# Patient Record
Sex: MALE | Race: WHITE | NOT HISPANIC OR LATINO | Employment: OTHER | URBAN - METROPOLITAN AREA
[De-identification: names, ages, dates, MRNs, and addresses within clinical notes are randomized per-mention and may not be internally consistent; named-entity substitution may affect disease eponyms.]

---

## 2017-01-11 ENCOUNTER — OFFICE VISIT (OUTPATIENT)
Dept: SURGERY | Facility: CLINIC | Age: 52
End: 2017-01-11

## 2017-01-11 DIAGNOSIS — K21.9 GASTROESOPHAGEAL REFLUX DISEASE, ESOPHAGITIS PRESENCE NOT SPECIFIED: Primary | ICD-10-CM

## 2017-01-11 DIAGNOSIS — Z12.11 ENCOUNTER FOR SCREENING COLONOSCOPY: ICD-10-CM

## 2017-01-11 DIAGNOSIS — K61.0 PERIANAL ABSCESS: ICD-10-CM

## 2017-01-11 PROCEDURE — 99024 POSTOP FOLLOW-UP VISIT: CPT | Performed by: SURGERY

## 2017-01-11 RX ORDER — SODIUM CHLORIDE 0.9 % (FLUSH) 0.9 %
1-10 SYRINGE (ML) INJECTION AS NEEDED
Status: CANCELLED | OUTPATIENT
Start: 2017-01-11

## 2017-01-11 NOTE — MR AVS SNAPSHOT
Rashid Maldonado   1/11/2017 9:00 AM   Office Visit    Dept Phone:  [phone]   Encounter #:  [id number]    Provider:  Denton Matthews MD   Department:  [facility] GENERAL SURGERY                Your Full Care Plan              Today's Medication Changes          These changes are accurate as of: 1/11/17  9:24 AM.  If you have any questions, ask your nurse or doctor.               Stop taking medication(s)listed here:     saccharomyces boulardii 250 MG capsule   Commonly known as:  FLORASTOR   Stopped by:  Denton Matthews MD                      Your Updated Medication List          This list is accurate as of: 1/11/17  9:24 AM.  Always use your most recent med list.                ALPRAZolam 0.25 MG tablet   Commonly known as:  XANAX       apixaban 5 MG tablet tablet   Commonly known as:  ELIQUIS       aspirin 81 MG chewable tablet       cefdinir 300 MG capsule   Commonly known as:  OMNICEF   Take 1 po bid       Chlorcyclizine-Pseudoephed 25-60 MG tablet   Commonly known as:  STAHIST AD   Take 1 tablet po tid prn       FIBER PO       fluticasone 50 MCG/ACT nasal spray   Commonly known as:  FLONASE       gabapentin 600 MG tablet   Commonly known as:  NEURONTIN       MULTIVITAMIN PO       omeprazole-sodium bicarbonate  MG per capsule   Commonly known as:  ZEGERID       PROBIOTIC PO       PROSTATE HEALTH PO       THYROID PO               We Performed the Following     Case Request     Case Request       You Were Diagnosed With        Codes Comments    Gastroesophageal reflux disease, esophagitis presence not specified    -  Primary ICD-10-CM: K21.9  ICD-9-CM: 530.81     Encounter for screening colonoscopy     ICD-10-CM: Z12.11  ICD-9-CM: V76.51     Perianal abscess     ICD-10-CM: K61.0  ICD-9-CM: 566       Instructions     None    Patient Instructions History      Upcoming Appointments     Visit Type Date Time Department    FOLLOW UP 1/11/2017  9:00 AM [facility]  [facility]    NEW PATIENT LAB 2017  8:40 AM  [facility]    NEW PT - HEMATOLOGY 2017  9:40 AM [facility] Signup     [facility] Viewpost allows you to send messages to your doctor, view your test results, renew your prescriptions, schedule appointments, and more. To sign up, go to [employer] and click on the Sign Up Now link in the New User? box. Enter your Viewpost Activation Code exactly as it appears below along with the last four digits of your Social Security Number and your Date of Birth () to complete the sign-up process. If you do not sign up before the expiration date, you must request a new code.    Viewpost Activation Code: [email or web address]  Expires: 2017  9:24 AM    If you have questions, you can email [email or web address] or call [phone] to talk to our Viewpost staff. Remember, Viewpost is NOT to be used for urgent needs. For medical emergencies, dial 911.               Other Info from Your Visit           Your Appointments     2017  8:40 AM EST   New Patient Lab with LAB CHAIR 1 [facility] ONC LAB (--)    [address] [id number] [phone]            2017  9:40 AM EST   NEW HEMATOLOGY with Henrry Gibbons MD   [facility] ([facility] [place])    [address] [address] [phone]              Allergies     No Known Allergies      Reason for Visit     Post-op PO incision and drainage of perianal abscess 16      Vital Signs     Smoking Status                   Former Smoker           Problems and Diagnoses Noted     Acid reflux disease    -  Primary    Screen for colon cancer        Perianal abscess

## 2017-01-11 NOTE — PROGRESS NOTES
Chief Complaint   Patient presents with   • Post-op     PO incision and drainage of perianal abscess 12/28/16       Rashid Maldonado is a 51 y.o. male  follows up today after recent drainage of a perianal abscess.  He says he's feeling much better and hasn't had any significant discomfort.  He still had small amounts of discharge.  In addition he has never had a colonoscopy and had an episode of what sounds like colitis earlier this year.  A CT scan was done at an outside hospital and demonstrated some thickening of the sigmoid colon.  Colonoscopy was not performed at that time as the patient is also been dealing with issues regards to his history of DVT and is on anticoagulants for this.  He is going to see hematologist next week I believe for evaluation of this and hopefully he can be taken off of his anticoagulants for a few days to undergo colonoscopy.    He also complains of some reflux symptoms which seem to be worse over the last couple of months.  Certain foods seem to worsen the symptoms.  Symptoms are worse when he lies down at night.    He does say that since the episode of colitis his bowels don't seem to be functioning quite as well and he is having more episodes of diarrhea.  He's not seeing any blood in his stools for quite some time.      Past Medical History   Diagnosis Date   • Anxiety and depression    • DVT (deep venous thrombosis)    • DVT (deep venous thrombosis)    • Pulmonary embolism    • Shortness of breath        Past Surgical History   Procedure Laterality Date   • Back surgery N/A 2015     done at [facility] in [place], [place]   • Knee surgery Left 1985     Dr. Gloria         Current Outpatient Prescriptions:   •  ALPRAZolam (XANAX) 0.25 MG tablet, Take 0.25 mg by mouth 2 (Two) Times a Day As Needed for anxiety., Disp: , Rfl:   •  apixaban (ELIQUIS) 5 MG tablet tablet, Take 5 mg by mouth 2 (Two) Times a Day., Disp: , Rfl:   •  aspirin 81 MG chewable tablet, Chew 81 mg Daily., Disp: ,  Rfl:   •  cefdinir (OMNICEF) 300 MG capsule, Take 1 po bid (Patient taking differently: Take 300 mg by mouth 2 (Two) Times a Day.), Disp: 20 capsule, Rfl: 0  •  Chlorcyclizine-Pseudoephed (STAHIST AD) 25-60 MG tablet, Take 1 tablet po tid prn (Patient taking differently: 1 tablet 3 (Three) Times a Day.), Disp: 30 tablet, Rfl: 0  •  FIBER PO, Take 1 capsule by mouth Daily., Disp: , Rfl:   •  fluticasone (FLONASE) 50 MCG/ACT nasal spray, 1 spray into each nostril As Needed., Disp: , Rfl: 2  •  gabapentin (NEURONTIN) 600 MG tablet, Take 300 mg by mouth 2 (Two) Times a Day. Take 1/2 of a 600 mg tablet in am and 1/2 of a 600 mg tablet in pm, Disp: , Rfl:   •  Misc Natural Products (PROSTATE HEALTH PO), Take 1 tablet by mouth Daily., Disp: , Rfl:   •  Multiple Vitamins-Minerals (MULTIVITAMIN PO), Take 1 tablet by mouth Daily. Resetigen-D, Disp: , Rfl:   •  omeprazole-sodium bicarbonate (ZEGERID)  MG per capsule, Take 1 capsule by mouth As Needed., Disp: , Rfl: 0  •  Probiotic Product (PROBIOTIC PO), Take 1 capsule by mouth Daily., Disp: , Rfl:   •  THYROID PO, Take 1 tablet by mouth Daily. Thyroid Support Nature Bound, Disp: , Rfl:     No Known Allergies    Family History   Problem Relation Age of Onset   • Hypertension Mother    • Lung cancer Mother        Social History     Social History   • Marital status:      Spouse name: N/A   • Number of children: N/A   • Years of education: N/A     Occupational History   • Not on file.     Social History Main Topics   • Smoking status: Former Smoker     Years: 17.00     Types: Cigarettes     Quit date: 2000   • Smokeless tobacco: Not on file   • Alcohol use Yes      Comment: occassionally    • Drug use: Yes     Special: Marijuana   • Sexual activity: Not on file     Other Topics Concern   • Not on file     Social History Narrative       ROS: 14 systems were reviewed and negative other than presenting complaints.          There were no vitals filed for this  visit.    PHYSICAL EXAM:  - Constitutional: Well-developed well-nourished, no acute distress  - Eyes: Conjunctiva normal, sclera nonicteric  - ENMT: Hearing grossly normal, oral mucosa moist  - Respiratory: Clear to auscultation, normal inspiratory effort  - Cardiovascular: Regular rate, no peripheral edema, no jugular venous distention  - Gastrointestinal: Soft, nontender, no palpable mass, no hepatosplenomegaly, negative for hernia, bowel sounds normal  - Skin: Warm, dry  - Musculoskeletal: Symmetric strength, normal gait  - Psychiatric: Alert and oriented ×3, normal affect   - Rectal :Incision of previous abscess is healing and looks quite good.  No cellulitis noted.  No significant hemorrhoidal disease.           Assessment/Plan     This is a nice gentleman who follows up after recent incision and drainage of an abscess.  This is healing well and I recommended just continued sitz baths for him in this regard.  He is due for a colonoscopy and is never had this done and so I think it would be appropriate to arrange this for him.  We will set it up after he sees his hematologist in case they felt it was not safe for him to come off of his anticoagulants.  In addition with regard to his reflux symptoms is probably reasonable to perform an upper endoscopy at the same time to ensure there is no hiatus hernia or other source for these symptoms.    The risks and benefits of colonoscopy and upper endoscopy were discussed with the patient and he agreed to proceed.       Denton Matthews MD  General and Endoscopic Surgery  [facility]    [address], [address]  [place], [place], [address]  P: [phone]  F: [phone]

## 2017-01-16 DIAGNOSIS — I82.409 DEEP VEIN THROMBOSIS (DVT) OF LOWER EXTREMITY, UNSPECIFIED CHRONICITY, UNSPECIFIED LATERALITY, UNSPECIFIED VEIN (HCC): Primary | ICD-10-CM

## 2017-01-17 ENCOUNTER — LAB (OUTPATIENT)
Dept: ONCOLOGY | Facility: HOSPITAL | Age: 52
End: 2017-01-17
Attending: SURGERY

## 2017-01-17 ENCOUNTER — CONSULT (OUTPATIENT)
Dept: ONCOLOGY | Facility: CLINIC | Age: 52
End: 2017-01-17

## 2017-01-17 VITALS
WEIGHT: 293 LBS | HEART RATE: 55 BPM | SYSTOLIC BLOOD PRESSURE: 149 MMHG | HEIGHT: 70 IN | DIASTOLIC BLOOD PRESSURE: 81 MMHG | OXYGEN SATURATION: 96 % | TEMPERATURE: 98 F | BODY MASS INDEX: 41.95 KG/M2 | RESPIRATION RATE: 16 BRPM

## 2017-01-17 DIAGNOSIS — I26.99 OTHER ACUTE PULMONARY EMBOLISM WITHOUT ACUTE COR PULMONALE (HCC): Primary | ICD-10-CM

## 2017-01-17 DIAGNOSIS — I82.409 DEEP VEIN THROMBOSIS (DVT) OF LOWER EXTREMITY, UNSPECIFIED CHRONICITY, UNSPECIFIED LATERALITY, UNSPECIFIED VEIN (HCC): ICD-10-CM

## 2017-01-17 LAB
BASOPHILS # BLD AUTO: 0.04 10*3/MM3 (ref 0–0.2)
BASOPHILS NFR BLD AUTO: 0.7 % (ref 0–1.5)
DEPRECATED RDW RBC AUTO: 41.7 FL (ref 37–54)
EOSINOPHIL # BLD AUTO: 0.26 10*3/MM3 (ref 0–0.7)
EOSINOPHIL NFR BLD AUTO: 4.5 % (ref 0.3–6.2)
ERYTHROCYTE [DISTWIDTH] IN BLOOD BY AUTOMATED COUNT: 13.1 % (ref 11.5–14.5)
HCT VFR BLD AUTO: 42.5 % (ref 40.4–52.2)
HGB BLD-MCNC: 14.6 G/DL (ref 13.7–17.6)
IMM GRANULOCYTES # BLD: 0.05 10*3/MM3 (ref 0–0.03)
IMM GRANULOCYTES NFR BLD: 0.9 % (ref 0–0.5)
LYMPHOCYTES # BLD AUTO: 2.12 10*3/MM3 (ref 0.9–4.8)
LYMPHOCYTES NFR BLD AUTO: 36.7 % (ref 19.6–45.3)
MCH RBC QN AUTO: 30.4 PG (ref 27–32.7)
MCHC RBC AUTO-ENTMCNC: 34.4 G/DL (ref 32.6–36.4)
MCV RBC AUTO: 88.5 FL (ref 79.8–96.2)
MONOCYTES # BLD AUTO: 0.58 10*3/MM3 (ref 0.2–1.2)
MONOCYTES NFR BLD AUTO: 10.1 % (ref 5–12)
NEUTROPHILS # BLD AUTO: 2.72 10*3/MM3 (ref 1.9–8.1)
NEUTROPHILS NFR BLD AUTO: 47.1 % (ref 42.7–76)
NRBC BLD MANUAL-RTO: 0 /100 WBC (ref 0–0)
PLATELET # BLD AUTO: 253 10*3/MM3 (ref 140–500)
PMV BLD AUTO: 9 FL (ref 6–12)
RBC # BLD AUTO: 4.8 10*6/MM3 (ref 4.6–6)
WBC NRBC COR # BLD: 5.77 10*3/MM3 (ref 4.5–10.7)

## 2017-01-17 PROCEDURE — 36415 COLL VENOUS BLD VENIPUNCTURE: CPT

## 2017-01-17 PROCEDURE — 99243 OFF/OP CNSLTJ NEW/EST LOW 30: CPT | Performed by: INTERNAL MEDICINE

## 2017-01-17 PROCEDURE — 85025 COMPLETE CBC W/AUTO DIFF WBC: CPT | Performed by: INTERNAL MEDICINE

## 2017-01-17 NOTE — LETTER
January 17, 2017     Denton Matthews MD  [address] [facility] [address]  [place] [address]    Patient: Rashid Maldonado   YOB: 1965   Date of Visit: 1/17/2017       Dear Dr. Byron MD:    Thank you for referring Rashid Maldonado to me for evaluation. Below are the relevant portions of my assessment and plan of care.    If you have questions, please do not hesitate to call me. I look forward to following Rashid along with you.         Sincerely,        Henrry Gibbons MD        CC: Meredith Lea Kehrer, MD Andrew Hart, MD  1/17/2017 10:46 AM  Signed  REFERRING PROVIDER:  Denton Matthews MD  [address] [facility] [facility] [place] [address]    REASON FOR CONSULTATION:    1.  Recurrent venous thromboembolism    HISTORY OF PRESENT ILLNESS:  Rashid Maldonado is a 51 y.o. male who is referred today recurrent venous thromboembolism.  He had a left lower extremity deep vein thrombosis diagnosed by duplex on 4/25/2012 in the left popliteal and peroneal veins.  By July 2012 the thrombosis had resolved.  The patient reports that at the time of his clots he had an upper respiratory infection and was laying around more but denies any other provoking factors at that time.  He apparently was anticoagulated for 3 months and then with the negative ultrasound in July this was discontinued.  The patient is a little bit unclear about the details.    He developed some dyspnea on exertion for a few weeks and then developed left lower extremity pain and swelling.  He presented to [facility] in [place] on 8/18/2016 with shortness of breath.  He also had a gastrointestinal illness at that time consistent with enteritis.  He was diagnosed with bilateral pulmonary emboli left greater than right.  A left lower extremity duplex showed a deep vein thrombosis and left femoral and popliteal veins.  He was started on Eliquis and remains anticoagulated.    He had a laboratory thrombophilia evaluation.  Testing for a lupus anticoagulant  was negative.  Anticardiolipin antibodies were normal.  Anti-thrombin levels were normal.  Protein C and S antigen and activity levels were normal.  Testing was negative for a factor V Leiden mutation.  I do not see that a prothrombin gene mutation analysis was done.    He subsequently developed a perianal abscess which required incision and drainage by Dr. Matthews on 12/28/2016.  He would like to perform upper and lower endoscopy.  He had CT imaging on 8/11/2016 at [facility] in [place] showing evidence of transverse and descending colitis.       He is doing well on the anticoagulation.  He denies significant bleeding.  He has chronic back pain which is better after laser surgery.  He has chronic hearing loss.  He continues to have some very mild dyspnea on exertion.  Left lower extremity pain and swelling has resolved.  He remains quite active.    Past Medical History   Diagnosis Date   • Anxiety and depression    • DVT (deep venous thrombosis)    • Pulmonary embolism    • Shortness of breath        Past Surgical History   Procedure Laterality Date   • Back surgery N/A 2015     done at [facility] in [place], [place]   • Knee surgery Left 1985     Dr. Gloria       SOCIAL HISTORY:   reports that he quit smoking about 17 years ago. His smoking use included Cigarettes. He quit after 17.00 years of use. He does not have any smokeless tobacco history on file. He reports that he drinks alcohol. He reports that he uses illicit drugs, including Marijuana.    FAMILY HISTORY:  family history includes Hypertension in his mother; Lung cancer in his mother.  Uncle with leukemia.  Grandfather with cancer.    ALLERGIES:  No Known Allergies    MEDICATIONS:  The medication list has been reviewed with the patient by the medical assistant, and the list has been updated in the electronic medical record, which I reviewed.  Medication dosages and frequencies were confirmed to be accurate.    Review of Systems   Constitutional:  "Negative for appetite change, chills, fatigue, fever and unexpected weight change.   HENT: Positive for hearing loss. Negative for congestion, dental problem, ear pain, mouth sores, nosebleeds, postnasal drip, rhinorrhea, tinnitus and trouble swallowing.    Eyes: Negative.    Respiratory: Positive for shortness of breath. Negative for cough, chest tightness, wheezing and stridor.    Cardiovascular: Negative for chest pain, palpitations and leg swelling.   Gastrointestinal: Negative for abdominal distention, abdominal pain, blood in stool, constipation, diarrhea, nausea and vomiting.   Endocrine: Negative.    Genitourinary: Negative for decreased urine volume, difficulty urinating, dysuria, flank pain, frequency, hematuria, scrotal swelling, testicular pain and urgency.   Musculoskeletal: Positive for arthralgias and back pain. Negative for joint swelling.   Allergic/Immunologic: Negative.    Neurological: Negative for dizziness, seizures, syncope, weakness, light-headedness, numbness and headaches.   Hematological: Negative for adenopathy. Does not bruise/bleed easily.   Psychiatric/Behavioral: Negative for confusion and sleep disturbance. The patient is not nervous/anxious.    All other systems reviewed and are negative.      Vitals:    01/17/17 0941   BP: 149/81   Pulse: 55   Resp: 16   Temp: 98 °F (36.7 °C)   SpO2: 96%   Weight: 293 lb (133 kg)   Height: 70.08\" (178 cm)  Comment: new ht   PainSc: 0-No pain       Physical Exam   Constitutional: He is oriented to person, place, and time. He appears well-developed and well-nourished. No distress.   overweight   HENT:   Head: Normocephalic and atraumatic.   Mouth/Throat: Oropharynx is clear and moist.   Eyes: Conjunctivae and EOM are normal. Pupils are equal, round, and reactive to light.   Neck: Normal range of motion. Neck supple. No thyromegaly present.   Cardiovascular: Normal rate, regular rhythm and normal heart sounds.  Exam reveals no gallop and no friction " rub.    No murmur heard.  Pulmonary/Chest: Effort normal and breath sounds normal. No respiratory distress. He has no wheezes. He has no rales.   Abdominal: Soft. Bowel sounds are normal. He exhibits no distension and no mass. There is no tenderness. There is no rebound and no guarding.   Musculoskeletal: Normal range of motion. He exhibits no edema or tenderness.   Trace left leg edema below the knee   Lymphadenopathy:     He has no cervical adenopathy.   Neurological: He is alert and oriented to person, place, and time. He has normal reflexes.   Skin: Skin is warm and dry. No rash noted. He is not diaphoretic.   Psychiatric: He has a normal mood and affect. His behavior is normal.   Nursing note and vitals reviewed.      DIAGNOSTIC DATA:  Lab Results   Component Value Date    WBC 5.77 01/17/2017    HGB 14.6 01/17/2017    HCT 42.5 01/17/2017    MCV 88.5 01/17/2017     01/17/2017       IMAGING:  None for review     ASSESSMENT:  This is a 51 y.o. male with recurrent left lower extremity deep vein thrombosis as well as bilateral pulmonary emboli.  He is currently on Eliquis which is certainly appropriate.  A laboratory thrombophilia evaluation was unremarkable.  I do not see that the prothrombin gene mutation was tested and so this could be done in the future if desired.  However, I do not believe that the results of this test will influence my recommendation, which would be for indefinite anticoagulation considering the fact that he has had recurrent venous thromboembolism that was relatively unprovoked aside from a short period of relative immobilization due to acute illness each time.    With respect to the issue of endoscopy, he would need to temporarily discontinue the Eliquis prior to the procedure.  Eliquis, given its short half life of about 12 hours, is usually held about 24-48 hours prior to procedures, and depending on the risk of bleeding after the procedure it is usually resumed the following day.   I would recommend that he resume Eliquis after the procedure and, again, stay on the medication indefinitely.     PLAN:   1.  He did not want to have further phlebotomy today for the prothrombin gene mutation testing, but this could certainly be done in the future if desired.  2.  I recommend he continue anticoagulation with Eliquis indefinitely.  3.  With respect to the endoscopy, I think it is okay for him to temporarily discontinue anticoagulation with Eliquis around the  Time of the procedure as discussed above.

## 2017-01-17 NOTE — PROGRESS NOTES
REFERRING PROVIDER:  Denton Matthews MD  [id number]  [address] [address]    REASON FOR CONSULTATION:    1.  Recurrent venous thromboembolism    HISTORY OF PRESENT ILLNESS:  Rashid Maldonado is a 51 y.o. male who is referred today recurrent venous thromboembolism.  He had a left lower extremity deep vein thrombosis diagnosed by duplex on 4/25/2012 in the left popliteal and peroneal veins.  By July 2012 the thrombosis had resolved.  The patient reports that at the time of his clots he had an upper respiratory infection and was laying around more but denies any other provoking factors at that time.  He apparently was anticoagulated for 3 months and then with the negative ultrasound in July this was discontinued.  The patient is a little bit unclear about the details.    He developed some dyspnea on exertion for a few weeks and then developed left lower extremity pain and swelling.  He presented to [facility] in [place] on 8/18/2016 with shortness of breath.  He also had a gastrointestinal illness at that time consistent with enteritis.  He was diagnosed with bilateral pulmonary emboli left greater than right.  A left lower extremity duplex showed a deep vein thrombosis and left femoral and popliteal veins.  He was started on Eliquis and remains anticoagulated.    He had a laboratory thrombophilia evaluation.  Testing for a lupus anticoagulant was negative.  Anticardiolipin antibodies were normal.  Anti-thrombin levels were normal.  Protein C and S antigen and activity levels were normal.  Testing was negative for a factor V Leiden mutation.  I do not see that a prothrombin gene mutation analysis was done.    He subsequently developed a perianal abscess which required incision and drainage by Dr. Matthews on 12/28/2016.  He would like to perform upper and lower endoscopy.  He had CT imaging on 8/11/2016 at [facility] in [place] showing evidence of transverse and descending colitis.        He is doing well on the anticoagulation.  He denies significant bleeding.  He has chronic back pain which is better after laser surgery.  He has chronic hearing loss.  He continues to have some very mild dyspnea on exertion.  Left lower extremity pain and swelling has resolved.  He remains quite active.    Past Medical History   Diagnosis Date   • Anxiety and depression    • DVT (deep venous thrombosis)    • Pulmonary embolism    • Shortness of breath        Past Surgical History   Procedure Laterality Date   • Back surgery N/A 2015     done at [facility] in [place], [place]   • Knee surgery Left 1985     Dr. Gloria       SOCIAL HISTORY:   reports that he quit smoking about 17 years ago. His smoking use included Cigarettes. He quit after 17.00 years of use. He does not have any smokeless tobacco history on file. He reports that he drinks alcohol. He reports that he uses illicit drugs, including Marijuana.    FAMILY HISTORY:  family history includes Hypertension in his mother; Lung cancer in his mother.  Uncle with leukemia.  Grandfather with cancer.    ALLERGIES:  No Known Allergies    MEDICATIONS:  The medication list has been reviewed with the patient by the medical assistant, and the list has been updated in the electronic medical record, which I reviewed.  Medication dosages and frequencies were confirmed to be accurate.    Review of Systems   Constitutional: Negative for appetite change, chills, fatigue, fever and unexpected weight change.   HENT: Positive for hearing loss. Negative for congestion, dental problem, ear pain, mouth sores, nosebleeds, postnasal drip, rhinorrhea, tinnitus and trouble swallowing.    Eyes: Negative.    Respiratory: Positive for shortness of breath. Negative for cough, chest tightness, wheezing and stridor.    Cardiovascular: Negative for chest pain, palpitations and leg swelling.   Gastrointestinal: Negative for abdominal distention, abdominal pain, blood in stool, constipation,  "diarrhea, nausea and vomiting.   Endocrine: Negative.    Genitourinary: Negative for decreased urine volume, difficulty urinating, dysuria, flank pain, frequency, hematuria, scrotal swelling, testicular pain and urgency.   Musculoskeletal: Positive for arthralgias and back pain. Negative for joint swelling.   Allergic/Immunologic: Negative.    Neurological: Negative for dizziness, seizures, syncope, weakness, light-headedness, numbness and headaches.   Hematological: Negative for adenopathy. Does not bruise/bleed easily.   Psychiatric/Behavioral: Negative for confusion and sleep disturbance. The patient is not nervous/anxious.    All other systems reviewed and are negative.      Vitals:    01/17/17 0941   BP: 149/81   Pulse: 55   Resp: 16   Temp: 98 °F (36.7 °C)   SpO2: 96%   Weight: 293 lb (133 kg)   Height: 70.08\" (178 cm)  Comment: new    PainSc: 0-No pain       Physical Exam   Constitutional: He is oriented to person, place, and time. He appears well-developed and well-nourished. No distress.   overweight   HENT:   Head: Normocephalic and atraumatic.   Mouth/Throat: Oropharynx is clear and moist.   Eyes: Conjunctivae and EOM are normal. Pupils are equal, round, and reactive to light.   Neck: Normal range of motion. Neck supple. No thyromegaly present.   Cardiovascular: Normal rate, regular rhythm and normal heart sounds.  Exam reveals no gallop and no friction rub.    No murmur heard.  Pulmonary/Chest: Effort normal and breath sounds normal. No respiratory distress. He has no wheezes. He has no rales.   Abdominal: Soft. Bowel sounds are normal. He exhibits no distension and no mass. There is no tenderness. There is no rebound and no guarding.   Musculoskeletal: Normal range of motion. He exhibits no edema or tenderness.   Trace left leg edema below the knee   Lymphadenopathy:     He has no cervical adenopathy.   Neurological: He is alert and oriented to person, place, and time. He has normal reflexes.   Skin: " Skin is warm and dry. No rash noted. He is not diaphoretic.   Psychiatric: He has a normal mood and affect. His behavior is normal.   Nursing note and vitals reviewed.      DIAGNOSTIC DATA:  Lab Results   Component Value Date    WBC 5.77 01/17/2017    HGB 14.6 01/17/2017    HCT 42.5 01/17/2017    MCV 88.5 01/17/2017     01/17/2017       IMAGING:  None for review     ASSESSMENT:  This is a 51 y.o. male with recurrent left lower extremity deep vein thrombosis as well as bilateral pulmonary emboli.  He is currently on Eliquis which is certainly appropriate.  A laboratory thrombophilia evaluation was unremarkable.  I do not see that the prothrombin gene mutation was tested and so this could be done in the future if desired.  However, I do not believe that the results of this test will influence my recommendation, which would be for indefinite anticoagulation considering the fact that he has had recurrent venous thromboembolism that was relatively unprovoked aside from a short period of relative immobilization due to acute illness each time.    With respect to the issue of endoscopy, he would need to temporarily discontinue the Eliquis prior to the procedure.  Eliquis, given its short half life of about 12 hours, is usually held about 24-48 hours prior to procedures, and depending on the risk of bleeding after the procedure it is usually resumed the following day.  I would recommend that he resume Eliquis after the procedure and, again, stay on the medication indefinitely.     PLAN:   1.  He did not want to have further phlebotomy today for the prothrombin gene mutation testing, but this could certainly be done in the future if desired.  2.  I recommend he continue anticoagulation with Eliquis indefinitely.  3.  With respect to the endoscopy, I think it is okay for him to temporarily discontinue anticoagulation with Eliquis around the  Time of the procedure as discussed above.

## 2017-01-19 ENCOUNTER — TELEPHONE (OUTPATIENT)
Dept: SURGERY | Facility: CLINIC | Age: 52
End: 2017-01-19

## 2017-01-20 ENCOUNTER — TELEPHONE (OUTPATIENT)
Dept: SURGERY | Facility: CLINIC | Age: 52
End: 2017-01-20

## 2017-01-20 NOTE — TELEPHONE ENCOUNTER
Pt has colonoscopy scheduled on 1/24 but still has open wound from drainage of perirectal abscess on 12/28/16, is it okay for him to go forward w/ the colonoscopy even though the wound is still open? Please advise.

## 2017-01-20 NOTE — TELEPHONE ENCOUNTER
That should be fine, just reminded the patient to take some sitz baths during the course of his bowel prep.

## 2017-01-24 ENCOUNTER — HOSPITAL ENCOUNTER (OUTPATIENT)
Facility: HOSPITAL | Age: 52
Setting detail: HOSPITAL OUTPATIENT SURGERY
Discharge: HOME | End: 2017-01-24
Attending: SURGERY | Admitting: SURGERY

## 2017-01-24 ENCOUNTER — ANESTHESIA EVENT (OUTPATIENT)
Dept: GASTROENTEROLOGY | Facility: HOSPITAL | Age: 52
End: 2017-01-24

## 2017-01-24 ENCOUNTER — ANESTHESIA (OUTPATIENT)
Dept: GASTROENTEROLOGY | Facility: HOSPITAL | Age: 52
End: 2017-01-24

## 2017-01-24 VITALS
TEMPERATURE: 98 F | RESPIRATION RATE: 16 BRPM | BODY MASS INDEX: 40.66 KG/M2 | SYSTOLIC BLOOD PRESSURE: 139 MMHG | DIASTOLIC BLOOD PRESSURE: 73 MMHG | WEIGHT: 284 LBS | OXYGEN SATURATION: 99 % | HEART RATE: 63 BPM | HEIGHT: 70 IN

## 2017-01-24 DIAGNOSIS — Z12.11 ENCOUNTER FOR SCREENING COLONOSCOPY: ICD-10-CM

## 2017-01-24 DIAGNOSIS — K21.9 GASTROESOPHAGEAL REFLUX DISEASE, ESOPHAGITIS PRESENCE NOT SPECIFIED: ICD-10-CM

## 2017-01-24 PROCEDURE — 88312 SPECIAL STAINS GROUP 1: CPT | Performed by: SURGERY

## 2017-01-24 PROCEDURE — 45378 DIAGNOSTIC COLONOSCOPY: CPT | Performed by: SURGERY

## 2017-01-24 PROCEDURE — 43239 EGD BIOPSY SINGLE/MULTIPLE: CPT | Performed by: SURGERY

## 2017-01-24 PROCEDURE — 25010000002 PROPOFOL 10 MG/ML EMULSION: Performed by: ANESTHESIOLOGY

## 2017-01-24 PROCEDURE — 25010000002 FENTANYL CITRATE (PF) 100 MCG/2ML SOLUTION: Performed by: ANESTHESIOLOGY

## 2017-01-24 PROCEDURE — 88305 TISSUE EXAM BY PATHOLOGIST: CPT | Performed by: SURGERY

## 2017-01-24 RX ORDER — PROPOFOL 10 MG/ML
VIAL (ML) INTRAVENOUS CONTINUOUS PRN
Status: DISCONTINUED | OUTPATIENT
Start: 2017-01-24 | End: 2017-01-24 | Stop reason: SURG

## 2017-01-24 RX ORDER — SODIUM CHLORIDE, SODIUM LACTATE, POTASSIUM CHLORIDE, CALCIUM CHLORIDE 600; 310; 30; 20 MG/100ML; MG/100ML; MG/100ML; MG/100ML
INJECTION, SOLUTION INTRAVENOUS CONTINUOUS PRN
Status: DISCONTINUED | OUTPATIENT
Start: 2017-01-24 | End: 2017-01-24 | Stop reason: SURG

## 2017-01-24 RX ORDER — FENTANYL CITRATE 50 UG/ML
INJECTION, SOLUTION INTRAMUSCULAR; INTRAVENOUS AS NEEDED
Status: DISCONTINUED | OUTPATIENT
Start: 2017-01-24 | End: 2017-01-24 | Stop reason: SURG

## 2017-01-24 RX ORDER — LIDOCAINE HYDROCHLORIDE 20 MG/ML
INJECTION, SOLUTION INFILTRATION; PERINEURAL AS NEEDED
Status: DISCONTINUED | OUTPATIENT
Start: 2017-01-24 | End: 2017-01-24 | Stop reason: SURG

## 2017-01-24 RX ORDER — SODIUM CHLORIDE, SODIUM LACTATE, POTASSIUM CHLORIDE, CALCIUM CHLORIDE 600; 310; 30; 20 MG/100ML; MG/100ML; MG/100ML; MG/100ML
9 INJECTION, SOLUTION INTRAVENOUS CONTINUOUS
Status: DISCONTINUED | OUTPATIENT
Start: 2017-01-24 | End: 2017-01-24 | Stop reason: HOSPADM

## 2017-01-24 RX ORDER — SODIUM CHLORIDE 0.9 % (FLUSH) 0.9 %
1-10 SYRINGE (ML) INJECTION AS NEEDED
Status: DISCONTINUED | OUTPATIENT
Start: 2017-01-24 | End: 2017-01-24 | Stop reason: HOSPADM

## 2017-01-24 RX ADMIN — FENTANYL CITRATE 50 MCG: 50 INJECTION INTRAMUSCULAR; INTRAVENOUS at 12:50

## 2017-01-24 RX ADMIN — SODIUM CHLORIDE, POTASSIUM CHLORIDE, SODIUM LACTATE AND CALCIUM CHLORIDE: 600; 310; 30; 20 INJECTION, SOLUTION INTRAVENOUS at 12:47

## 2017-01-24 RX ADMIN — PROPOFOL 160 MCG/KG/MIN: 10 INJECTION, EMULSION INTRAVENOUS at 12:50

## 2017-01-24 RX ADMIN — LIDOCAINE HYDROCHLORIDE 50 MG: 20 INJECTION, SOLUTION INFILTRATION; PERINEURAL at 12:50

## 2017-01-24 RX ADMIN — SODIUM CHLORIDE, POTASSIUM CHLORIDE, SODIUM LACTATE AND CALCIUM CHLORIDE 9 ML/HR: 600; 310; 30; 20 INJECTION, SOLUTION INTRAVENOUS at 11:31

## 2017-01-24 NOTE — IP AVS SNAPSHOT
AFTER VISIT SUMMARY             Rashid Maldonado           About your hospitalization     You were admitted on:  January 24, 2017 You last received care in the:  [facility] ENDO SUITES       Procedures & Surgeries      Procedure(s) (LRB):  COLONOSCOPY to Cecum (N/A)  ESOPHAGOGASTRODUODENOSCOPY with Bx's (N/A)     1/24/2017     Surgeon(s):  Denton Matthews MD  -------------------      Medications    If you or your caregiver advised us that you are currently taking a medication and that medication is marked below as “Resume”, this simply indicates that we have reviewed those medications to make sure our new therapy recommendations do not interfere.  If you have concerns about medications other than those new ones which we are prescribing today, please consult the physician who prescribed them (or your primary physician).  Our review of your home medications is not meant to indicate that we are directing their use.             Your Medications      CHANGE how you take these medications     Chlorcyclizine-Pseudoephed 25-60 MG tablet   Take 1 tablet po tid prn   According to our records, you may have been taking this medication differently.   Commonly known as:  STAHIST AD   What changed:    - how much to take  - when to take this  - additional instructions             CONTINUE taking these medications     ALPRAZolam 0.25 MG tablet   Take 0.25 mg by mouth 2 (Two) Times a Day As Needed for anxiety.   Commonly known as:  XANAX           apixaban 5 MG tablet tablet   Take 5 mg by mouth 2 (Two) Times a Day.   Commonly known as:  ELIQUIS           aspirin 81 MG chewable tablet   Chew 81 mg Daily.           FIBER PO   Take 1 capsule by mouth Daily.           fluticasone 50 MCG/ACT nasal spray   1 spray into each nostril As Needed.   Commonly known as:  FLONASE           gabapentin 600 MG tablet   Take 300 mg by mouth 2 (Two) Times a Day. Take 1/2 of a 600 mg tablet in am and 1/2 of a 600 mg tablet in pm      Commonly known as:  NEURONTIN           PROBIOTIC PO   Take 1 capsule by mouth Daily.           Unable to find   1 each 1 (One) Time. Resetigen-D                      Your Medications      Your Medication List           Morning Noon Evening Bedtime As Needed    ALPRAZolam 0.25 MG tablet   Take 0.25 mg by mouth 2 (Two) Times a Day As Needed for anxiety.   Commonly known as:  XANAX                                apixaban 5 MG tablet tablet   Take 5 mg by mouth 2 (Two) Times a Day.   Commonly known as:  ELIQUIS                                aspirin 81 MG chewable tablet   Chew 81 mg Daily.                                Chlorcyclizine-Pseudoephed 25-60 MG tablet   Take 1 tablet po tid prn   Commonly known as:  STAHIST AD                                FIBER PO   Take 1 capsule by mouth Daily.                                fluticasone 50 MCG/ACT nasal spray   1 spray into each nostril As Needed.   Commonly known as:  FLONASE                                gabapentin 600 MG tablet   Take 300 mg by mouth 2 (Two) Times a Day. Take 1/2 of a 600 mg tablet in am and 1/2 of a 600 mg tablet in pm   Commonly known as:  NEURONTIN                                PROBIOTIC PO   Take 1 capsule by mouth Daily.                                Unable to find   1 each 1 (One) Time. Resetigen-D                                         Instructions for After Discharge          Discharge Instructions       Call Dr. Matthews at [phone] if you have any questions or concerns    Discharge References/Attachments     COLONOSCOPY, CARE AFTER, EASY-TO-READ (ENGLISH)    DIVERTICULOSIS (ENGLISH)    ESOPHAGOGASTRODUODENOSCOPY, CARE AFTER (ENGLISH)    DUODENITIS (ENGLISH)       Follow-ups for After Discharge        [facility] Signup     [facility] [employer] allows you to send messages to your doctor, view your test results, renew your prescriptions, schedule appointments, and more. To sign up, go to [employer] and click on the Sign Up  Now link in the New User? box. Enter your [employer] Activation Code exactly as it appears below along with the last four digits of your Social Security Number and your Date of Birth () to complete the sign-up process. If you do not sign up before the expiration date, you must request a new code.    [employer] Activation Code: [email or web address]  Expires: 2017  9:24 AM    If you have questions, you can email [email or web address] or call [phone] to talk to our [employer] staff. Remember, [employer] is NOT to be used for urgent needs. For medical emergencies, dial 911.           Summary of Your Hospitalization        Reason for Hospitalization     Your primary diagnosis was:  Not on File      Care Providers     Provider Service Role Specialty    Denton Matthews MD Surgery Attending Provider General Surgery    Denton Matthews MD Surgery Surgeon  General Surgery      Your Allergies  Date Reviewed: 2017    No active allergies      Pending Labs     Order Current Status    Tissue Exam Collected (17 1141)      Patient Belongings Returned     Document Return of Belongings Flowsheet     Were the patient bedside belongings sent home?   --   Belongings Retrieved from Security & Sent Home   --    Belongings Sent to Safe   --   Medications Retrieved from Pharmacy & Sent Home   --              More Information      Colonoscopy, Care After  These instructions give you information on caring for yourself after your procedure. Your doctor may also give you more specific instructions. Call your doctor if you have any problems or questions after your procedure.  HOME CARE  · Do not drive for 24 hours.  · Do not sign important papers or use machinery for 24 hours.  · You may shower.  · You may go back to your usual activities, but go slower for the first 24 hours.  · Take rest breaks often during the first 24 hours.  · Walk around or use warm packs on your belly (abdomen) if you have belly cramping or gas.  · Drink  enough fluids to keep your pee (urine) clear or pale yellow.  · Resume your normal diet. Avoid heavy or fried foods.  · Avoid drinking alcohol for 24 hours or as told by your doctor.  · Only take medicines as told by your doctor.  If a tissue sample (biopsy) was taken during the procedure:   · Do not take aspirin or blood thinners for 7 days, or as told by your doctor.  · Do not drink alcohol for 7 days, or as told by your doctor.  · Eat soft foods for the first 24 hours.  GET HELP IF:  You still have a small amount of blood in your poop (stool) 2-3 days after the procedure.  GET HELP RIGHT AWAY IF:  · You have more than a small amount of blood in your poop.  · You see clumps of tissue (blood clots) in your poop.  · Your belly is puffy (swollen).  · You feel sick to your stomach (nauseous) or throw up (vomit).  · You have a fever.  · You have belly pain that gets worse and medicine does not help.  MAKE SURE YOU:  · Understand these instructions.  · Will watch your condition.  · Will get help right away if you are not doing well or get worse.     This information is not intended to replace advice given to you by your health care provider. Make sure you discuss any questions you have with your health care provider.     Document Released: 01/20/2012 Document Revised: 12/23/2014 Document Reviewed: 08/25/2014  [employer] Interactive Patient Education ©2016 [employer]          Diverticulosis  Diverticulosis is the condition that develops when small pouches (diverticula) form in the wall of your colon. Your colon, or large intestine, is where water is absorbed and stool is formed. The pouches form when the inside layer of your colon pushes through weak spots in the outer layers of your colon.  CAUSES   No one knows exactly what causes diverticulosis.  RISK FACTORS  · Being older than 50. Your risk for this condition increases with age. Diverticulosis is rare in people younger than 40 years. By age 80, almost everyone has  it.  · Eating a low-fiber diet.  · Being frequently constipated.  · Being overweight.  · Not getting enough exercise.  · Smoking.  · Taking over-the-counter pain medicines, like aspirin and ibuprofen.  SYMPTOMS   Most people with diverticulosis do not have symptoms.  DIAGNOSIS   Because diverticulosis often has no symptoms, health care providers often discover the condition during an exam for other colon problems. In many cases, a health care provider will diagnose diverticulosis while using a flexible scope to examine the colon (colonoscopy).  TREATMENT   If you have never developed an infection related to diverticulosis, you may not need treatment. If you have had an infection before, treatment may include:  · Eating more fruits, vegetables, and grains.  · Taking a fiber supplement.  · Taking a live bacteria supplement (probiotic).  · Taking medicine to relax your colon.  HOME CARE INSTRUCTIONS   · Drink at least 6-8 glasses of water each day to prevent constipation.  · Try not to strain when you have a bowel movement.  · Keep all follow-up appointments.  If you have had an infection before:   · Increase the fiber in your diet as directed by your health care provider or dietitian.  · Take a dietary fiber supplement if your health care provider approves.  · Only take medicines as directed by your health care provider.  SEEK MEDICAL CARE IF:   · You have abdominal pain.  · You have bloating.  · You have cramps.  · You have not gone to the bathroom in 3 days.  SEEK IMMEDIATE MEDICAL CARE IF:   · Your pain gets worse.  · Your bloating becomes very bad.  · You have a fever or chills, and your symptoms suddenly get worse.  · You begin vomiting.  · You have bowel movements that are bloody or black.  MAKE SURE YOU:  · Understand these instructions.  · Will watch your condition.  · Will get help right away if you are not doing well or get worse.     This information is not intended to replace advice given to you by your  health care provider. Make sure you discuss any questions you have with your health care provider.     Document Released: 09/14/2005 Document Revised: 12/23/2014 Document Reviewed: 11/12/2014  [employer]YouNet Patient Education ©2016 Elsevier Inc.          Esophagogastroduodenoscopy, Care After  Refer to this sheet in the next few weeks. These instructions provide you with information about caring for yourself after your procedure. Your health care provider may also give you more specific instructions. Your treatment has been planned according to current medical practices, but problems sometimes occur. Call your health care provider if you have any problems or questions after your procedure.  WHAT TO EXPECT AFTER THE PROCEDURE  After your procedure, it is typical to feel:  · Soreness in your throat.  · Pain with swallowing.  · Sick to your stomach (nauseous).  · Bloated.  · Dizzy.  · Fatigued.  HOME CARE INSTRUCTIONS  · Do not drink any alcohol for 24 hours.  · Do not drive or operate machinery for 24 hours.  · Take medicines only as directed by your health care provider.  SEEK MEDICAL CARE IF:   · You cannot stop coughing.  · You are not urinating at all or less than usual.  SEEK IMMEDIATE MEDICAL CARE IF:  · You have difficulty swallowing.  · You cannot eat or drink.  · You have worsening throat or chest pain.  · You have dizziness or lightheadedness or you faint.  · You have nausea or vomiting.  · You have chills.  · You have a fever.  · You have severe abdominal pain.  · You have black, tarry, or bloody stools.     This information is not intended to replace advice given to you by your health care provider. Make sure you discuss any questions you have with your health care provider.     Document Released: 12/04/2013 Document Revised: 01/08/2016 Document Reviewed: 12/04/2013  [employer]YouNet Patient Education ©2016 Elsevier Inc.          Duodenitis  Duodenitis is inflammation of the lining of the first  part of your small intestine (duodenum). There are two types of duodenitis:  · Acute duodenitis (develops suddenly and is short lived).  ·  Chronic duodenitis (develops over an extended period and lasts months to years).  CAUSES   Duodenitis is most often caused by infection with the bacterium Helicobacter pylori (H. pylori). H. pylori increases the production of stomach acid and causes changes in the environment of the duodenum. This irritates and damages the cells of the duodenum causing inflammation.  Other causes of duodenitis include:   · Long-term use of nonsteroidal anti-inflammatory drugs (NSAIDs). NSAIDs change the lining of the duodenum and make it more prone to injury from stomach acid.  · Excessive use of alcohol. Alcohol increases stomach acid and changes the lining of the duodenum which makes it more likely for inflammation to develop.  · Giardiasis. Giardiasis is a common infection of the small intestine. It can cause inflammation of the duodenum.    · Other gastrointestinal disorders, such as Crohn disease. People with these disorders are more likely to develop duodenitis.  SYMPTOMS   Although duodenitis does not always cause symptoms, symptoms that do occur include:  · Nausea or vomiting.  · Gassy, bloated feeling or an uncomfortable feeling of fullness after eating.  · Burning, cramps, or pain in the upper abdominal area.  DIAGNOSIS   To diagnose duodenitis, your health care provider may use results from:   · An exam of the duodenum using a thin tube with a tiny camera on the tip, which is placed down your throat (endoscope). The endoscope is passed through your stomach and into your duodenum. Sometimes a sample of tissue from your duodenum is removed with the endoscope. The sample is then examined under a microscope (biopsy) for signs of inflammation and H. pylori infection.    · Tests that check samples of your blood or stool for H. pylori infection.    · A test that checks the gases in a sample  of your  breath for H. pylori infection. The test measures the levels of carbon dioxide in your breath after you drink a special solution.  · An X-ray exam using a special liquid that you swallow to illuminate your digestive tract (barium) to show signs of inflammation.  TREATMENT   Treatment will depend on the cause of the duodenitis. The most common treatments include:  · Use of medication to treat infection.  · Medication to reduce stomach acid.  · Discontinuing the use of NSAIDs.  · Management of other gastrointestinal conditions.  · Avoiding alcohol consumption.  Additionally, taking the following steps can help to reduce the severity of your symptoms:  · Drink enough water to keep your urine clear or pale yellow.  · Avoid consuming these foods or drinks:    Caffeinated drinks.    Chocolate.    Peppermint or mint-flavored food or drinks.    Garlic.    Onions.    Spicy foods.    Citrus fruits, such as oranges, onesimo, or limes.    Foods that use tomato-based sauces, such as pasta sauce, chili, salsa, and pizza.    Fatty foods.    Fried foods.     This information is not intended to replace advice given to you by your health care provider. Make sure you discuss any questions you have with your health care provider.     Document Released: 2014 Document Revised: 2016 Document Reviewed: 2014  Senior[employer] Interactive Patient Education ©6 [employer].            SYMPTOMS OF A STROKE    Call 911 or have someone take you to the Emergency Department if you have any of the following:    · Sudden numbness or weakness of your face, arm or leg especially on one side of the body  · Sudden confusion, diffiiculty speaking or trouble understanding   · Changes in your vision or loss of sight in one eye  · Sudden severe headache with no known cause  · sudden dizziness, trouble walking, loss of balance or coordination    It is important to seek emergency care right away if you have further stroke symptoms.  If you get emergency help quickly, the powerful clot-dissolving medicines can reduce the disabilities caused by a stroke.     For more information:    American Stroke Association  1-153-9-STROKE  www.strokeassociation.org           IF YOU SMOKE OR USE TOBACCO PLEASE READ THE FOLLOWING:    Why is smoking bad for me?  Smoking increases the risk of heart disease, lung disease, vascular disease, stroke, and cancer.     If you smoke, STOP!    If you would like more information on quitting smoking, please visit the [employer] website: www.[employer]/healthier-together/smoke   This link will provide additional resources including the QUIT line and the Beat the Pack support groups.     For more information:    American Cancer Society  (505) 904-2388    American Heart Association  1-940.196.8587               YOU ARE THE MOST IMPORTANT FACTOR IN YOUR RECOVERY.     Follow all instructions carefully.     I have reviewed my discharge instructions with my nurse, including the following information, if applicable:     Information about my illness and diagnosis   Follow up appointments (including lab draws)   Wound Care   Equipment Needs   Medications (new and continuing) along with side effects   Preventative information such as vaccines and smoking cessations   Diet   Pain   I know when to contact my Doctor's office or seek emergency care      I want my nurse to describe the side effects of my medications: YES NO   If the answer is no, I understand the side effects of my medications: YES NO   My nurse described the side effects of my medications in a way that I could understand: YES NO   I have taken my personal belongings and my own medications with me at discharge: YES NO            I have received this information and my questions have been answered. I have discussed any concerns I see with this plan with the nurse or physician. I understand these instructions.    Signature of Patient or  Responsible Person: _____________________________________    Date: _________________  Time: __________________    Signature of Healthcare Provider: _______________________________________  Date: _________________  Time: __________________

## 2017-01-24 NOTE — ANESTHESIA PREPROCEDURE EVALUATION
Anesthesia Evaluation     Patient summary reviewed and Nursing notes reviewed    Airway   Mallampati: II  TM distance: >3 FB  Neck ROM: full  no difficulty expected  Dental      Pulmonary - normal exam   (+) pulmonary embolism, shortness of breath,   Cardiovascular - negative cardio ROS and normal exam    Neuro/Psych- negative ROS  GI/Hepatic/Renal/Endo    (+)  GERD,     Musculoskeletal (-) negative ROS    Abdominal   (+) obese,    Substance History - negative use     OB/GYN negative ob/gyn ROS         Other                             Anesthesia Plan    ASA 3     MAC

## 2017-01-24 NOTE — INTERVAL H&P NOTE
H&P reviewed. The patient was examined and there are no changes to the H&P.     Denton Matthews MD  General and Endoscopic Surgery  [facility]    [address], [address]  [place], [place], [address]  P: [phone]  F: [phone]

## 2017-01-24 NOTE — PLAN OF CARE
Problem: Patient Care Overview (Adult)  Goal: Plan of Care Review  Outcome: Ongoing (interventions implemented as appropriate)    01/24/17 1119   Coping/Psychosocial Response Interventions   Plan Of Care Reviewed With patient   Patient Care Overview   Progress progress toward functional goals as expected       Goal: Adult Individualization and Mutuality  Outcome: Ongoing (interventions implemented as appropriate)    01/24/17 1119   Individualization   Patient Specific Preferences [place]       Goal: Discharge Needs Assessment  Outcome: Ongoing (interventions implemented as appropriate)    01/24/17 1119   Discharge Needs Assessment   Concerns To Be Addressed denies needs/concerns at this time   Discharge Disposition home or self-care   Living Environment   Transportation Available car         Problem: GI Endoscopy (Adult)  Goal: Signs and Symptoms of Listed Potential Problems Will be Absent or Manageable (GI Endoscopy)  Outcome: Ongoing (interventions implemented as appropriate)    01/24/17 1119   GI Endoscopy   Problems Assessed (GI Endoscopy) all   Problems Present (GI Endoscopy) none

## 2017-01-24 NOTE — ANESTHESIA POSTPROCEDURE EVALUATION
Patient: Rashid Maldonado    Procedure Summary     Date Anesthesia Start Anesthesia Stop Room / Location    01/24/17 1247 1318  [facility] /  [facility]       Procedure Diagnosis Surgeon Provider    COLONOSCOPY to Cecum (N/A ); ESOPHAGOGASTRODUODENOSCOPY with Bx's (N/A Esophagus) Encounter for screening colonoscopy  (Encounter for screening colonoscopy [Z12.11]) MD Warren Velez MD          Anesthesia Type: No value filed.  Last vitals  BP      Temp      Pulse     Resp      SpO2        Post Anesthesia Care and Evaluation    Patient location during evaluation: PACU  Patient participation: complete - patient participated  Level of consciousness: awake and alert  Pain management: adequate  Airway patency: patent  Anesthetic complications: No anesthetic complications    Cardiovascular status: acceptable  Respiratory status: acceptable  Hydration status: acceptable

## 2017-01-24 NOTE — H&P (VIEW-ONLY)
Chief Complaint   Patient presents with   • Post-op     PO incision and drainage of perianal abscess 12/28/16       Rashid Maldonado is a 51 y.o. male  follows up today after recent drainage of a perianal abscess.  He says he's feeling much better and hasn't had any significant discomfort.  He still had small amounts of discharge.  In addition he has never had a colonoscopy and had an episode of what sounds like colitis earlier this year.  A CT scan was done at an outside hospital and demonstrated some thickening of the sigmoid colon.  Colonoscopy was not performed at that time as the patient is also been dealing with issues regards to his history of DVT and is on anticoagulants for this.  He is going to see hematologist next week I believe for evaluation of this and hopefully he can be taken off of his anticoagulants for a few days to undergo colonoscopy.    He also complains of some reflux symptoms which seem to be worse over the last couple of months.  Certain foods seem to worsen the symptoms.  Symptoms are worse when he lies down at night.    He does say that since the episode of colitis his bowels don't seem to be functioning quite as well and he is having more episodes of diarrhea.  He's not seeing any blood in his stools for quite some time.      Past Medical History   Diagnosis Date   • Anxiety and depression    • DVT (deep venous thrombosis)    • DVT (deep venous thrombosis)    • Pulmonary embolism    • Shortness of breath        Past Surgical History   Procedure Laterality Date   • Back surgery N/A 2015     done at [facility] in [place], [place]   • Knee surgery Left 1985     Dr. Gloria         Current Outpatient Prescriptions:   •  ALPRAZolam (XANAX) 0.25 MG tablet, Take 0.25 mg by mouth 2 (Two) Times a Day As Needed for anxiety., Disp: , Rfl:   •  apixaban (ELIQUIS) 5 MG tablet tablet, Take 5 mg by mouth 2 (Two) Times a Day., Disp: , Rfl:   •  aspirin 81 MG chewable tablet, Chew 81 mg Daily., Disp: ,  Rfl:   •  cefdinir (OMNICEF) 300 MG capsule, Take 1 po bid (Patient taking differently: Take 300 mg by mouth 2 (Two) Times a Day.), Disp: 20 capsule, Rfl: 0  •  Chlorcyclizine-Pseudoephed (STAHIST AD) 25-60 MG tablet, Take 1 tablet po tid prn (Patient taking differently: 1 tablet 3 (Three) Times a Day.), Disp: 30 tablet, Rfl: 0  •  FIBER PO, Take 1 capsule by mouth Daily., Disp: , Rfl:   •  fluticasone (FLONASE) 50 MCG/ACT nasal spray, 1 spray into each nostril As Needed., Disp: , Rfl: 2  •  gabapentin (NEURONTIN) 600 MG tablet, Take 300 mg by mouth 2 (Two) Times a Day. Take 1/2 of a 600 mg tablet in am and 1/2 of a 600 mg tablet in pm, Disp: , Rfl:   •  Misc Natural Products (PROSTATE HEALTH PO), Take 1 tablet by mouth Daily., Disp: , Rfl:   •  Multiple Vitamins-Minerals (MULTIVITAMIN PO), Take 1 tablet by mouth Daily. Resetigen-D, Disp: , Rfl:   •  omeprazole-sodium bicarbonate (ZEGERID)  MG per capsule, Take 1 capsule by mouth As Needed., Disp: , Rfl: 0  •  Probiotic Product (PROBIOTIC PO), Take 1 capsule by mouth Daily., Disp: , Rfl:   •  THYROID PO, Take 1 tablet by mouth Daily. Thyroid Support Nature Bound, Disp: , Rfl:     No Known Allergies    Family History   Problem Relation Age of Onset   • Hypertension Mother    • Lung cancer Mother        Social History     Social History   • Marital status:      Spouse name: N/A   • Number of children: N/A   • Years of education: N/A     Occupational History   • Not on file.     Social History Main Topics   • Smoking status: Former Smoker     Years: 17.00     Types: Cigarettes     Quit date: 2000   • Smokeless tobacco: Not on file   • Alcohol use Yes      Comment: occassionally    • Drug use: Yes     Special: Marijuana   • Sexual activity: Not on file     Other Topics Concern   • Not on file     Social History Narrative       ROS: 14 systems were reviewed and negative other than presenting complaints.          There were no vitals filed for this  visit.    PHYSICAL EXAM:  - Constitutional: Well-developed well-nourished, no acute distress  - Eyes: Conjunctiva normal, sclera nonicteric  - ENMT: Hearing grossly normal, oral mucosa moist  - Respiratory: Clear to auscultation, normal inspiratory effort  - Cardiovascular: Regular rate, no peripheral edema, no jugular venous distention  - Gastrointestinal: Soft, nontender, no palpable mass, no hepatosplenomegaly, negative for hernia, bowel sounds normal  - Skin: Warm, dry  - Musculoskeletal: Symmetric strength, normal gait  - Psychiatric: Alert and oriented ×3, normal affect   - Rectal :Incision of previous abscess is healing and looks quite good.  No cellulitis noted.  No significant hemorrhoidal disease.           Assessment/Plan     This is a nice gentleman who follows up after recent incision and drainage of an abscess.  This is healing well and I recommended just continued sitz baths for him in this regard.  He is due for a colonoscopy and is never had this done and so I think it would be appropriate to arrange this for him.  We will set it up after he sees his hematologist in case they felt it was not safe for him to come off of his anticoagulants.  In addition with regard to his reflux symptoms is probably reasonable to perform an upper endoscopy at the same time to ensure there is no hiatus hernia or other source for these symptoms.    The risks and benefits of colonoscopy and upper endoscopy were discussed with the patient and he agreed to proceed.       Denton Matthews MD  General and Endoscopic Surgery  [facility]    [address], [address]  [place], [place], [address]  P: [phone]  F: [phone]

## 2017-01-24 NOTE — OP NOTE
Operative Note :   MD Rashid Kaufman  1965    Procedure Date: 01/24/17    Pre-op Diagnosis:  · Epigastric pain  · Screening colonoscopy    Post-op Diagnosis:  · Mild duodenitis  · Pandiverticulosis    Procedure:   · Esophagogastroduodenoscopy with biopsy of antrum  · Colonoscopy to cecum    Surgeon: Denton Matthews MD      Anesthesia: MAC    Indications:  · This is a nice 51-year-old gentleman who is been having some worsening epigastric discomfort and acid reflux.  In addition he had never had a colonoscopy.    Findings:   · Mild duodenitis without ulceration, otherwise normal upper endoscopy  · Mild diverticulosis involving the right colon only    Recommendations:   · Repeat colonoscopy in 10 years, or sooner as clinically indicated  · High fiber diet  · Continue acid reflux medicine as needed    Description of procedure:    After obtaining informed consent, patient was brought to the endoscopy suite and was sedated.  The flexible Olympus endoscope was then inserted via the patient's mouth and then passed through the cricopharyngeus into the esophagus and through the esophagus past the GE junction into the stomach then through the pylorus and into the duodenum.  The duodenum had some areas of mild inflammation without evidence for ulceration.  The duodenal bulb and pyloric channel were relatively normal.  Scope was pulled back into the stomach.  The antrum, body and fundus were unremarkable.  Retroflexion within the stomach showed no significant evidence for hiatus hernia.  Biopsies of the antrum were taken for H. pylori testing.  Scope was then straightened out and the stomach was desufflated.  The GE junction was fairly unremarkable without any significant evidence for reflux changes.  Scope was then removed through the remainder the esophagus and this was unremarkable.  Patient was then turned around.  Digital rectal examination was undertaken was unremarkable.  The flexible Olympus  endoscope was inserted through the rectum and passed around with minimal difficulty requiring minimal amount of pressure to the level of the cecum.  The cecum was identified by visualization of the cecal sling and appendiceal orifice and crow's foot.  Scope was then slowly withdrawn, carefully visualizing all mucosal surfaces.  The cecum was unremarkable.  The ascending colon had moderate number of wide mouth and noninflamed diverticula.  The hepatic flexure, transverse colon, splenic flexure, descending colon were unremarkable.  Sigmoid colon had a few benign-appearing diverticula.  Rectum was unremarkable.  Retroflexion within the rectum showed no significant hemorrhoidal disease.  Scope was removed completely.  Patient was then brought back to recovery in good condition.  He tolerated the procedure well.  There were no problems or complications identified.    Denton Matthews MD  General and Endoscopic Surgery  [facility]    [address], [address]  [place], [place], [address]  P: [phone]  F: [phone]

## 2017-01-25 LAB
CYTO UR: NORMAL
LAB AP CASE REPORT: NORMAL
Lab: NORMAL
PATH REPORT.FINAL DX SPEC: NORMAL
PATH REPORT.GROSS SPEC: NORMAL

## 2019-08-26 ENCOUNTER — TELEPHONE (OUTPATIENT)
Dept: FAMILY MEDICINE CLINIC | Facility: CLINIC | Age: 54
End: 2019-08-26

## 2019-08-26 RX ORDER — GABAPENTIN 300 MG/1
300 CAPSULE ORAL 3 TIMES DAILY
Qty: 90 CAPSULE | Refills: 1 | Status: SHIPPED | OUTPATIENT
Start: 2019-08-26 | End: 2019-12-27

## 2019-10-01 ENCOUNTER — OFFICE VISIT (OUTPATIENT)
Dept: FAMILY MEDICINE CLINIC | Facility: CLINIC | Age: 54
End: 2019-10-01

## 2019-10-01 VITALS
WEIGHT: 303 LBS | HEART RATE: 78 BPM | BODY MASS INDEX: 42.42 KG/M2 | TEMPERATURE: 97.8 F | OXYGEN SATURATION: 97 % | DIASTOLIC BLOOD PRESSURE: 80 MMHG | SYSTOLIC BLOOD PRESSURE: 120 MMHG | HEIGHT: 71 IN

## 2019-10-01 DIAGNOSIS — G89.4 CHRONIC PAIN SYNDROME: Primary | ICD-10-CM

## 2019-10-01 DIAGNOSIS — F32.89 ATYPICAL DEPRESSIVE DISORDER: ICD-10-CM

## 2019-10-01 DIAGNOSIS — R53.83 FATIGUE, UNSPECIFIED TYPE: ICD-10-CM

## 2019-10-01 DIAGNOSIS — J44.9 CHRONIC OBSTRUCTIVE PULMONARY DISEASE, UNSPECIFIED COPD TYPE (HCC): ICD-10-CM

## 2019-10-01 DIAGNOSIS — Z79.899 LONG-TERM USE OF HIGH-RISK MEDICATION: ICD-10-CM

## 2019-10-01 DIAGNOSIS — J30.1 SEASONAL ALLERGIC RHINITIS DUE TO POLLEN: ICD-10-CM

## 2019-10-01 DIAGNOSIS — F41.0 PANIC DISORDER WITHOUT AGORAPHOBIA: ICD-10-CM

## 2019-10-01 DIAGNOSIS — I82.409 RECURRENT DEEP VEIN THROMBOSIS (DVT) OF LOWER EXTREMITY, UNSPECIFIED LATERALITY (HCC): ICD-10-CM

## 2019-10-01 PROCEDURE — 99214 OFFICE O/P EST MOD 30 MIN: CPT | Performed by: FAMILY MEDICINE

## 2019-10-01 RX ORDER — FLUTICASONE PROPIONATE 50 MCG
1 SPRAY, SUSPENSION (ML) NASAL DAILY
Qty: 18.2 ML | Refills: 2 | Status: SHIPPED | OUTPATIENT
Start: 2019-10-01

## 2019-10-01 NOTE — PROGRESS NOTES
Kiah Maldonado is a 54 y.o. male.     Chief Complaint   Patient presents with   • Establish Care   • Anxiety        History of Present Illness       The following portions of the patient's history were reviewed and updated as appropriate: allergies, current medications, past family history, past medical history, past social history, past surgical history and problem list.    Past Medical History:   Diagnosis Date   • Abdominal pain, LLQ    • Abnormal findings on diagnostic imaging of abdomen    • Acute deep vein thrombosis of lower extremity (CMS/HCC)    • Acute sinusitis    • Anxiety and depression    • Attention deficit disorder    • Atypical depressive disorder    • Bloating    • BMI 40.0-44.9, adult (CMS/HCC)    • Chest pain    • Chronic pain syndrome    • Colitis    • COPD (chronic obstructive pulmonary disease) (CMS/HCC)    • Cramps, muscle, general    • Dehydration    • Depression    • Diarrhea    • Difficulty breathing    • Disc degeneration, lumbosacral    • DVT (deep venous thrombosis) (CMS/HCC)     left leg   • DVT, lower extremity, recurrent (CMS/HCC)    • Edema    • Esophageal reflux    • Fatigue    • Fever    • GERD (gastroesophageal reflux disease)    • Heartburn    • Hyperlipidemia    • Idiopathic peripheral neuropathy    • Idiopathic urticaria    • Inflamed skin tag    • Insomnia    • Joint pain    • Long-term use of high-risk medication    • Low back pain    • Lumbago    • Lumbosacral neuritis    • Lymphadenitis    • Melanocytic nevus    • Nausea    • Neoplasm of uncertain behavior of skin    • Neuralgia    • Neuritis    • Organic impotence    • Osteoarthritis, multiple sites    • Panic disorder without agoraphobia    • Prothrombin mutation (CMS/HCC)    • Pulmonary embolism (CMS/HCC)     bilateral lungs   • Pulmonary embolism (CMS/HCC)    • Shingles    • Shortness of breath        Past Surgical History:   Procedure Laterality Date   • BACK SURGERY N/A 2015    done at [facility] in  [place], [place]   • COLONOSCOPY N/A 2017    Procedure: COLONOSCOPY to Cecum;  Surgeon: Denton Matthews MD;  Location: [facility] ENDOSCOPY;  Service:    • ENDOSCOPY N/A 2017    Procedure: ESOPHAGOGASTRODUODENOSCOPY with Bx's;  Surgeon: Denton Matthews MD;  Location: [facility] ENDOSCOPY;  Service:    • KNEE SURGERY Left     Dr. Gloria   • OTHER SURGICAL HISTORY      post spinal diskect osteophytect lumb interspace microdiscec       Family History   Problem Relation Age of Onset   • Hypertension Mother    • Lung cancer Mother    • Anxiety disorder Mother    • Depression Mother    • Heart disease Mother    • Leukemia Paternal Uncle    • Diabetes Paternal Grandmother    • Prostate cancer Paternal Grandfather    • Hypothyroidism Father    • Prostate cancer Maternal Grandfather        Social History     Socioeconomic History   • Marital status:      Spouse name: Yasmin   • Number of children: Not on file   • Years of education: Not on file   • Highest education level: Not on file   Occupational History     Employer: SELF-EMPLOYED   Tobacco Use   • Smoking status: Former Smoker     Years: 17.00     Types: Cigarettes     Last attempt to quit: 2000     Years since quittin.7   • Smokeless tobacco: Never Used   Substance and Sexual Activity   • Alcohol use: Yes     Comment: Occasionally   • Drug use: No   • Sexual activity: Yes         Current Outpatient Medications:   •  ALPRAZolam (XANAX) 0.25 MG tablet, Take 0.25 mg by mouth 2 (Two) Times a Day As Needed for anxiety., Disp: , Rfl:   •  apixaban (ELIQUIS) 5 MG tablet tablet, Take 1 tablet by mouth Every 12 (Twelve) Hours., Disp: 60 tablet, Rfl: 2  •  aspirin 81 MG chewable tablet, Chew 81 mg Daily., Disp: , Rfl:   •  FIBER PO, Take 1 capsule by mouth Daily., Disp: , Rfl:   •  fluticasone (FLONASE) 50 MCG/ACT nasal spray, 1 spray into the nostril(s) as directed by provider Daily., Disp: 18.2 mL, Rfl: 2  •  gabapentin (NEURONTIN) 300 MG capsule, Take 1  "capsule by mouth 3 (Three) Times a Day., Disp: 90 capsule, Rfl: 1  •  Omega-3 Fatty Acids (OMEGA 3 PO), Take  by mouth., Disp: , Rfl:     Review of Systems   Constitutional: Negative for chills, fatigue and fever.   HENT: Negative for congestion, rhinorrhea and sore throat.    Respiratory: Negative for cough and shortness of breath.    Cardiovascular: Negative for chest pain and leg swelling.   Gastrointestinal: Negative for abdominal pain.   Endocrine: Negative for polydipsia and polyuria.   Genitourinary: Negative for dysuria.   Musculoskeletal: Negative for arthralgias and myalgias.   Skin: Negative for rash.   Neurological: Negative for dizziness.   Hematological: Does not bruise/bleed easily.   Psychiatric/Behavioral: Negative for sleep disturbance.       Objective   Vitals:    10/01/19 1515   BP: 120/80   Pulse: 78   Temp: 97.8 °F (36.6 °C)   SpO2: 97%   Weight: (!) 137 kg (303 lb)   Height: 180.3 cm (71\")     Body mass index is 42.26 kg/m².  Physical Exam   Constitutional: He is oriented to person, place, and time. He appears well-developed and well-nourished. No distress.   HENT:   Head: Normocephalic and atraumatic.   Mouth/Throat: Oropharynx is clear and moist.   Eyes: Conjunctivae are normal. Pupils are equal, round, and reactive to light.   Neck: Neck supple. No thyromegaly present.   Cardiovascular: Normal rate and regular rhythm.   No murmur heard.  Pulmonary/Chest: Effort normal and breath sounds normal.   Musculoskeletal: He exhibits no edema.   Neurological: He is alert and oriented to person, place, and time.   Skin: Skin is warm and dry.   Psychiatric: He has a normal mood and affect.         Assessment/Plan   Rashid was seen today for establish care and anxiety.    Diagnoses and all orders for this visit:    Chronic pain syndrome    Recurrent deep vein thrombosis (DVT) of lower extremity, unspecified laterality (CMS/HCC)    Chronic obstructive pulmonary disease, unspecified COPD type " (CMS/[facility])    Panic disorder without agoraphobia    Long-term use of high-risk medication    Atypical depressive disorder    Seasonal allergic rhinitis due to pollen  -     fluticasone (FLONASE) 50 MCG/ACT nasal spray; 1 spray into the nostril(s) as directed by provider Daily.    Fatigue, unspecified type  -     CBC & Differential  -     Comprehensive Metabolic Panel  -     TSH               Patient Instructions   Get back on the Flonase.

## 2019-10-02 LAB
ALBUMIN SERPL-MCNC: 4.1 G/DL (ref 3.5–5.5)
ALBUMIN/GLOB SERPL: 1.6 {RATIO} (ref 1.2–2.2)
ALP SERPL-CCNC: 74 IU/L (ref 39–117)
ALT SERPL-CCNC: 20 IU/L (ref 0–44)
AST SERPL-CCNC: 17 IU/L (ref 0–40)
BASOPHILS # BLD AUTO: 0 X10E3/UL (ref 0–0.2)
BASOPHILS NFR BLD AUTO: 0 %
BILIRUB SERPL-MCNC: 0.2 MG/DL (ref 0–1.2)
BUN SERPL-MCNC: 15 MG/DL (ref 6–24)
BUN/CREAT SERPL: 19 (ref 9–20)
CALCIUM SERPL-MCNC: 9 MG/DL (ref 8.7–10.2)
CHLORIDE SERPL-SCNC: 103 MMOL/L (ref 96–106)
CO2 SERPL-SCNC: 22 MMOL/L (ref 20–29)
CREAT SERPL-MCNC: 0.77 MG/DL (ref 0.76–1.27)
EOSINOPHIL # BLD AUTO: 0.3 X10E3/UL (ref 0–0.4)
EOSINOPHIL NFR BLD AUTO: 4 %
ERYTHROCYTE [DISTWIDTH] IN BLOOD BY AUTOMATED COUNT: 13 % (ref 12.3–15.4)
GLOBULIN SER CALC-MCNC: 2.5 G/DL (ref 1.5–4.5)
GLUCOSE SERPL-MCNC: 126 MG/DL (ref 65–99)
HCT VFR BLD AUTO: 44.6 % (ref 37.5–51)
HGB BLD-MCNC: 14.7 G/DL (ref 13–17.7)
IMM GRANULOCYTES # BLD AUTO: 0 X10E3/UL (ref 0–0.1)
IMM GRANULOCYTES NFR BLD AUTO: 0 %
LYMPHOCYTES # BLD AUTO: 2.1 X10E3/UL (ref 0.7–3.1)
LYMPHOCYTES NFR BLD AUTO: 27 %
MCH RBC QN AUTO: 30.5 PG (ref 26.6–33)
MCHC RBC AUTO-ENTMCNC: 33 G/DL (ref 31.5–35.7)
MCV RBC AUTO: 93 FL (ref 79–97)
MONOCYTES # BLD AUTO: 0.7 X10E3/UL (ref 0.1–0.9)
MONOCYTES NFR BLD AUTO: 9 %
NEUTROPHILS # BLD AUTO: 4.7 X10E3/UL (ref 1.4–7)
NEUTROPHILS NFR BLD AUTO: 60 %
PLATELET # BLD AUTO: 244 X10E3/UL (ref 150–450)
POTASSIUM SERPL-SCNC: 4.2 MMOL/L (ref 3.5–5.2)
PROT SERPL-MCNC: 6.6 G/DL (ref 6–8.5)
RBC # BLD AUTO: 4.82 X10E6/UL (ref 4.14–5.8)
SODIUM SERPL-SCNC: 141 MMOL/L (ref 134–144)
TSH SERPL DL<=0.005 MIU/L-ACNC: 1.93 UIU/ML (ref 0.45–4.5)
WBC # BLD AUTO: 7.9 X10E3/UL (ref 3.4–10.8)

## 2019-10-22 RX ORDER — ALPRAZOLAM 0.25 MG/1
TABLET ORAL
Qty: 30 TABLET | Refills: 0 | Status: SHIPPED | OUTPATIENT
Start: 2019-10-22 | End: 2020-02-20 | Stop reason: SDUPTHER

## 2019-10-22 NOTE — TELEPHONE ENCOUNTER
Refill, please make sure quanity and refills came over correctly. It was an electronic refill request.

## 2019-12-27 DIAGNOSIS — G89.4 CHRONIC PAIN SYNDROME: Primary | ICD-10-CM

## 2019-12-27 RX ORDER — GABAPENTIN 300 MG/1
CAPSULE ORAL
Qty: 90 CAPSULE | Refills: 0 | Status: SHIPPED | OUTPATIENT
Start: 2019-12-27 | End: 2020-02-20 | Stop reason: SDUPTHER

## 2019-12-27 RX ORDER — APIXABAN 5 MG/1
TABLET, FILM COATED ORAL
Qty: 60 TABLET | Refills: 2 | Status: SHIPPED | OUTPATIENT
Start: 2019-12-27 | End: 2020-04-15 | Stop reason: SDUPTHER

## 2020-01-20 ENCOUNTER — OFFICE VISIT (OUTPATIENT)
Dept: SURGERY | Facility: CLINIC | Age: 55
End: 2020-01-20

## 2020-01-20 VITALS — WEIGHT: 300.6 LBS | HEIGHT: 70 IN | OXYGEN SATURATION: 97 % | BODY MASS INDEX: 43.03 KG/M2 | HEART RATE: 64 BPM

## 2020-01-20 DIAGNOSIS — K62.89 ANAL OR RECTAL PAIN: Primary | ICD-10-CM

## 2020-01-20 PROCEDURE — 99213 OFFICE O/P EST LOW 20 MIN: CPT | Performed by: SURGERY

## 2020-01-20 NOTE — PROGRESS NOTES
Chief complaint: Anal discomfort    History of present illness:  54-year-old gentleman who had an incision and drainage of a perianal abscess around 3 years ago says that he has persistently had some mild discomfort in the area.  He actually did have a little bit of rectal bleeding, but this stopped about 6 months ago.  It sounded more hemorrhoidal in nature than anything.  He describes a feeling of burning and itching and discomfort and has concerns that he has persistent drainage or abscess.    Review of systems:  Constitutional: Negative for fever, chills, change in weight or appetite  Gastrointestinal: Negative for nausea, vomiting, positive for bloating, diarrhea, rectal bleeding resolved    Physical exam:  Body mass index 43.1  Discussed with patient increased perioperative risks associated with obesity including increased risks of DVT, infection, seromas, poor wound healing and hernias (with abdominal surgery).  General: Awake alert and oriented, no distress  Neck: Supple, no lymphadenopathy or thyromegaly  Gastrointestinal: Abdomen is soft benign, no mass or hernia  Rectal: No external hemorrhoids noted.  Normal external anal sphincter exam.  No evidence of abscess is seen.  I do not see any evidence for fistula at this time.    Assessment and plan:  -Anal discomfort  -Morbid obesity with body mass index over 40  -Options are discussed with patient.  He had a colonoscopy a couple of years ago which was normal outside of diverticular changes, I do not think repeating that at this time would be useful  -I have suggested we try some Anusol ointment and regular fiber supplementation  -He will follow-up in 3 months    Denton Matthews MD  General and Endoscopic Surgery  [facility]    [address], [address]  [place], [place], [address]  P: [phone]  F: [phone]

## 2020-02-20 ENCOUNTER — OFFICE VISIT (OUTPATIENT)
Dept: FAMILY MEDICINE CLINIC | Facility: CLINIC | Age: 55
End: 2020-02-20

## 2020-02-20 VITALS
OXYGEN SATURATION: 99 % | BODY MASS INDEX: 42.69 KG/M2 | HEART RATE: 75 BPM | HEIGHT: 70 IN | TEMPERATURE: 97.9 F | DIASTOLIC BLOOD PRESSURE: 78 MMHG | WEIGHT: 298.2 LBS | SYSTOLIC BLOOD PRESSURE: 128 MMHG

## 2020-02-20 DIAGNOSIS — Z79.899 LONG-TERM USE OF HIGH-RISK MEDICATION: ICD-10-CM

## 2020-02-20 DIAGNOSIS — F32.89 ATYPICAL DEPRESSIVE DISORDER: Primary | ICD-10-CM

## 2020-02-20 DIAGNOSIS — Z79.899 HIGH RISK MEDICATION USE: ICD-10-CM

## 2020-02-20 DIAGNOSIS — G89.4 CHRONIC PAIN SYNDROME: ICD-10-CM

## 2020-02-20 DIAGNOSIS — F41.0 PANIC DISORDER WITHOUT AGORAPHOBIA: ICD-10-CM

## 2020-02-20 DIAGNOSIS — D68.59 HYPERCOAGULABLE STATE (HCC): ICD-10-CM

## 2020-02-20 PROBLEM — D68.52 PROTHROMBIN MUTATION (HCC): Status: ACTIVE | Noted: 2020-02-20

## 2020-02-20 PROBLEM — I26.99 PULMONARY EMBOLISM: Status: RESOLVED | Noted: 2017-01-17 | Resolved: 2020-02-20

## 2020-02-20 PROBLEM — I82.409 DVT, LOWER EXTREMITY, RECURRENT (HCC): Status: RESOLVED | Noted: 2019-10-01 | Resolved: 2020-02-20

## 2020-02-20 PROCEDURE — 99213 OFFICE O/P EST LOW 20 MIN: CPT | Performed by: FAMILY MEDICINE

## 2020-02-20 RX ORDER — ALPRAZOLAM 0.25 MG/1
0.25 TABLET ORAL EVERY 12 HOURS PRN
Qty: 30 TABLET | Refills: 0 | Status: SHIPPED | OUTPATIENT
Start: 2020-02-20 | End: 2020-08-25 | Stop reason: SDUPTHER

## 2020-02-20 RX ORDER — GABAPENTIN 300 MG/1
300 CAPSULE ORAL 3 TIMES DAILY
Qty: 90 CAPSULE | Refills: 2 | Status: SHIPPED | OUTPATIENT
Start: 2020-02-20 | End: 2020-06-17

## 2020-02-20 NOTE — PROGRESS NOTES
Subjective   Rashid Maldonado is a 54 y.o. male.     Chief Complaint   Patient presents with   • Pain     low back pain, gabapentin refills        Patient presents for routine follow-up on his atypical mood disorder panic attacks and chronic low back pain.  He still doing well with the gabapentin.  He does still think that helps his anxiety.  He needs a refill of his alprazolam because he is down to only 3 pills.  He only takes it once or twice a week.  He thinks he might go see the spine surgeon that did his minimally invasive discectomy up in [place] a few years ago.  The gabapentin helps the neuropathy as well.         The following portions of the patient's history were reviewed and updated as appropriate: allergies, current medications, past family history, past medical history, past social history, past surgical history and problem list.    Past Medical History:   Diagnosis Date   • Abdominal pain, LLQ    • Abnormal findings on diagnostic imaging of abdomen    • Acute deep vein thrombosis of lower extremity (CMS/HCC)    • Acute sinusitis    • Anxiety and depression    • Attention deficit disorder    • Atypical depressive disorder    • Bloating    • BMI 40.0-44.9, adult (CMS/HCC)    • Chest pain    • Chronic pain syndrome    • Colitis    • COPD (chronic obstructive pulmonary disease) (CMS/HCC)    • Cramps, muscle, general    • Dehydration    • Depression    • Diarrhea    • Difficulty breathing    • Disc degeneration, lumbosacral    • DVT (deep venous thrombosis) (CMS/HCC)     left leg   • DVT, lower extremity, recurrent (CMS/HCC)    • Edema    • Fatigue    • Fever    • GERD (gastroesophageal reflux disease)    • Heartburn    • Hyperlipidemia    • Idiopathic peripheral neuropathy    • Idiopathic urticaria    • Inflamed skin tag    • Insomnia    • Joint pain    • Long-term use of high-risk medication    • Low back pain    • Lumbago    • Lumbosacral neuritis    • Lymphadenitis    • Melanocytic nevus    • Nausea     • Neoplasm of uncertain behavior of skin    • Neuralgia    • Neuritis    • Organic impotence    • Osteoarthritis, multiple sites    • Panic disorder without agoraphobia    • Prothrombin mutation (CMS/HCC)    • Pulmonary embolism (CMS/HCC)     bilateral lungs   • Shingles    • Shortness of breath        Past Surgical History:   Procedure Laterality Date   • BACK SURGERY N/A     done at [facility] in [place], [place]   • COLONOSCOPY N/A 2017    Procedure: COLONOSCOPY to Cecum;  Surgeon: Denton Matthews MD;  Location: [facility] ENDOSCOPY;  Service:    • ENDOSCOPY N/A 2017    Procedure: ESOPHAGOGASTRODUODENOSCOPY with Bx's;  Surgeon: Denton Matthews MD;  Location: [facility] ENDOSCOPY;  Service:    • KNEE SURGERY Left     Dr. Gloria   • KNEE SURGERY Right    • OTHER SURGICAL HISTORY      post spinal diskect osteophytect lumb interspace microdiscec       Family History   Problem Relation Age of Onset   • Hypertension Mother    • Lung cancer Mother    • Anxiety disorder Mother    • Depression Mother    • Heart disease Mother    • Leukemia Paternal Uncle    • Diabetes Paternal Grandmother    • Prostate cancer Paternal Grandfather    • Hypothyroidism Father    • Prostate cancer Maternal Grandfather        Social History     Socioeconomic History   • Marital status:      Spouse name: Yasmin   • Number of children: Not on file   • Years of education: Not on file   • Highest education level: Not on file   Occupational History     Employer: SELF-EMPLOYED   Tobacco Use   • Smoking status: Former Smoker     Years: 17.00     Types: Cigarettes     Last attempt to quit: 2000     Years since quittin.1   • Smokeless tobacco: Never Used   Substance and Sexual Activity   • Alcohol use: Yes     Comment: Occasionally   • Drug use: No   • Sexual activity: Yes         Current Outpatient Medications:   •  ALPRAZolam (XANAX) 0.25 MG tablet, Take 1 tablet by mouth Every 12 (Twelve) Hours As Needed for Anxiety.,  "Disp: 30 tablet, Rfl: 0  •  aspirin 81 MG chewable tablet, Chew 81 mg Daily., Disp: , Rfl:   •  ELIQUIS 5 MG tablet tablet, TAKE ONE TABLET BY MOUTH EVERY 12 HOURS, Disp: 60 tablet, Rfl: 2  •  FIBER PO, Take 1 capsule by mouth As Needed., Disp: , Rfl:   •  gabapentin (NEURONTIN) 300 MG capsule, Take 1 capsule by mouth 3 (Three) Times a Day., Disp: 90 capsule, Rfl: 2  •  hydrocortisone (ANUSOL-HC) 2.5 % rectal cream, Apply rectally 2 times daily, Disp: 30 g, Rfl: 1  •  Omega-3 Fatty Acids (OMEGA 3 PO), Take 1 capsule by mouth Daily., Disp: , Rfl:   •  fluticasone (FLONASE) 50 MCG/ACT nasal spray, 1 spray into the nostril(s) as directed by provider Daily. (Patient taking differently: 1 spray into the nostril(s) as directed by provider As Needed.), Disp: 18.2 mL, Rfl: 2    Review of Systems   Constitutional: Negative for chills, fatigue and fever.   HENT: Negative for congestion, rhinorrhea and sore throat.    Respiratory: Negative for cough and shortness of breath.    Cardiovascular: Negative for chest pain and leg swelling.   Gastrointestinal: Negative for abdominal pain.   Endocrine: Negative for polydipsia and polyuria.   Genitourinary: Negative for dysuria.   Musculoskeletal: Positive for back pain. Negative for arthralgias and myalgias.   Skin: Negative for rash.   Neurological: Negative for dizziness.   Hematological: Does not bruise/bleed easily.   Psychiatric/Behavioral: Positive for dysphoric mood. Negative for self-injury, sleep disturbance and suicidal ideas. The patient is nervous/anxious.        Objective   Vitals:    02/20/20 1349   BP: 128/78   Pulse: 75   Temp: 97.9 °F (36.6 °C)   SpO2: 99%   Weight: 135 kg (298 lb 3.2 oz)   Height: 177.8 cm (70\")     Body mass index is 42.79 kg/m².  Physical Exam   Constitutional: He is oriented to person, place, and time. He appears well-developed and well-nourished. No distress. He appears overweight.   HENT:   Head: Normocephalic and atraumatic.   Mouth/Throat: " Oropharynx is clear and moist.   Eyes: Pupils are equal, round, and reactive to light. Conjunctivae are normal.   Neck: Neck supple. No thyromegaly present.   Cardiovascular: Normal rate and regular rhythm.   No murmur heard.  Pulmonary/Chest: Effort normal and breath sounds normal.   Musculoskeletal: He exhibits no edema.   Neurological: He is alert and oriented to person, place, and time.   Skin: Skin is warm and dry.   Psychiatric: He has a normal mood and affect.         Assessment/Plan   Rashid was seen today for pain.    Diagnoses and all orders for this visit:    Atypical depressive disorder    Panic disorder without agoraphobia  -     ALPRAZolam (XANAX) 0.25 MG tablet; Take 1 tablet by mouth Every 12 (Twelve) Hours As Needed for Anxiety.    Long-term use of high-risk medication    Chronic pain syndrome  -     gabapentin (NEURONTIN) 300 MG capsule; Take 1 capsule by mouth 3 (Three) Times a Day.    Hypercoagulable state (CMS/HCC)    High risk medication use  -     Drug Profile [id number] - Urine, Clean Catch               There are no Patient Instructions on file for this visit.

## 2020-02-22 LAB
AMPHETAMINES UR QL SCN: NEGATIVE NG/ML
BARBITURATES UR QL: NEGATIVE NG/ML
BENZODIAZ UR QL SCN: NEGATIVE NG/ML
BZE UR QL: NEGATIVE NG/ML
CANNABINOIDS UR QL SCN: NEGATIVE NG/ML
CREAT UR-MCNC: 128.7 MG/DL (ref 20–300)
ETHANOL UR-MCNC: NEGATIVE %
MEPERIDINE UR QL: NEGATIVE NG/ML
METHADONE UR QL: NEGATIVE NG/ML
OPIATES UR QL SCN: NEGATIVE NG/ML
OXYCODONE+OXYMORPHONE UR QL SCN: NEGATIVE NG/ML
PCP UR QL: NEGATIVE NG/ML
PROPOXYPH UR QL: NEGATIVE NG/ML
TRAMADOL UR QL SCN: NEGATIVE NG/ML

## 2020-05-21 ENCOUNTER — OFFICE VISIT (OUTPATIENT)
Dept: FAMILY MEDICINE CLINIC | Facility: CLINIC | Age: 55
End: 2020-05-21

## 2020-05-21 VITALS
WEIGHT: 298.6 LBS | SYSTOLIC BLOOD PRESSURE: 142 MMHG | HEIGHT: 70 IN | TEMPERATURE: 97.1 F | HEART RATE: 70 BPM | DIASTOLIC BLOOD PRESSURE: 72 MMHG | BODY MASS INDEX: 42.75 KG/M2 | OXYGEN SATURATION: 98 %

## 2020-05-21 DIAGNOSIS — Z79.899 HIGH RISK MEDICATION USE: ICD-10-CM

## 2020-05-21 DIAGNOSIS — Z23 NEED FOR TDAP VACCINATION: ICD-10-CM

## 2020-05-21 DIAGNOSIS — G89.4 CHRONIC PAIN SYNDROME: ICD-10-CM

## 2020-05-21 DIAGNOSIS — Z00.00 ROUTINE HEALTH MAINTENANCE: Primary | ICD-10-CM

## 2020-05-21 DIAGNOSIS — F32.89 ATYPICAL DEPRESSIVE DISORDER: ICD-10-CM

## 2020-05-21 DIAGNOSIS — Z80.42 FAMILY HISTORY OF PROSTATE CANCER: ICD-10-CM

## 2020-05-21 DIAGNOSIS — Z11.59 NEED FOR HEPATITIS C SCREENING TEST: ICD-10-CM

## 2020-05-21 DIAGNOSIS — D68.59 HYPERCOAGULABLE STATE (HCC): ICD-10-CM

## 2020-05-21 DIAGNOSIS — R03.0 ELEVATED BLOOD PRESSURE READING: ICD-10-CM

## 2020-05-21 PROCEDURE — 90471 IMMUNIZATION ADMIN: CPT | Performed by: FAMILY MEDICINE

## 2020-05-21 PROCEDURE — 90715 TDAP VACCINE 7 YRS/> IM: CPT | Performed by: FAMILY MEDICINE

## 2020-05-21 PROCEDURE — 99396 PREV VISIT EST AGE 40-64: CPT | Performed by: FAMILY MEDICINE

## 2020-05-21 NOTE — PROGRESS NOTES
Subjective   Rashid Maldonado is a 54 y.o. male who presents for annual male wellness exam.  Chief Complaint   Patient presents with   • Annual Exam     Patient presents for his annual exam.  He is stable with his chronic pain and depression.  Compliant with his medications as listed.  He denies any suicidal or homicidal ideation.  He is also compliant with his Eliquis for his hypercoagulable state.    Sexual History: Yes  Contraception: N/A  Diet: Standard  Exercise: No  Do you feel safe?  Yes  Have you ever been abused?  No  Last dental exam: Up-to-date  Eye exam: Up-to-date    Colon Cancer Screening: January 1, 2007  PSA: N/A      Immunization History   Administered Date(s) Administered   • Tdap 05/21/2020       The following portions of the patient's history were reviewed and updated as appropriate: allergies, current medications, past family history, past medical history, past social history, past surgical history and problem list.    Past Medical History:   Diagnosis Date   • Abdominal pain, LLQ    • Abnormal findings on diagnostic imaging of abdomen    • Acute deep vein thrombosis of lower extremity (CMS/HCC)    • Acute sinusitis    • Anxiety and depression    • Attention deficit disorder    • Atypical depressive disorder    • Bloating    • BMI 40.0-44.9, adult (CMS/HCC)    • Chest pain    • Chronic pain syndrome    • Colitis    • COPD (chronic obstructive pulmonary disease) (CMS/HCC)    • Cramps, muscle, general    • Dehydration    • Depression    • Diarrhea    • Difficulty breathing    • Disc degeneration, lumbosacral    • DVT (deep venous thrombosis) (CMS/HCC)     left leg   • DVT, lower extremity, recurrent (CMS/HCC)    • Edema    • Fatigue    • Fever    • GERD (gastroesophageal reflux disease)    • Heartburn    • Hyperlipidemia    • Idiopathic peripheral neuropathy    • Idiopathic urticaria    • Inflamed skin tag    • Insomnia    • Joint pain    • Long-term use of high-risk medication    • Low back pain     • Lumbago    • Lumbosacral neuritis    • Lymphadenitis    • Melanocytic nevus    • Nausea    • Neoplasm of uncertain behavior of skin    • Neuralgia    • Neuritis    • Organic impotence    • Osteoarthritis, multiple sites    • Panic disorder without agoraphobia    • Prothrombin mutation (CMS/HCC)    • Pulmonary embolism (CMS/HCC)     bilateral lungs   • Shingles    • Shortness of breath        Past Surgical History:   Procedure Laterality Date   • BACK SURGERY N/A     done at [facility] in [place], [place]   • COLONOSCOPY N/A 2017    Procedure: COLONOSCOPY to Cecum;  Surgeon: Denton Matthews MD;  Location: [facility] ENDOSCOPY;  Service:    • ENDOSCOPY N/A 2017    Procedure: ESOPHAGOGASTRODUODENOSCOPY with Bx's;  Surgeon: Denton Matthews MD;  Location: [facility] ENDOSCOPY;  Service:    • KNEE SURGERY Left     Dr. Gloria   • KNEE SURGERY Right    • OTHER SURGICAL HISTORY      post spinal diskect osteophytect lumb interspace microdiscec       Family History   Problem Relation Age of Onset   • Hypertension Mother    • Lung cancer Mother    • Anxiety disorder Mother    • Depression Mother    • Heart disease Mother    • Leukemia Paternal Uncle    • Diabetes Paternal Grandmother    • Prostate cancer Paternal Grandfather    • Hypothyroidism Father    • Prostate cancer Maternal Grandfather        Social History     Socioeconomic History   • Marital status:      Spouse name: Yasmin   • Number of children: Not on file   • Years of education: Not on file   • Highest education level: Not on file   Occupational History     Employer: SELF-EMPLOYED   Tobacco Use   • Smoking status: Former Smoker     Years: 17.00     Types: Cigarettes     Last attempt to quit: 2000     Years since quittin.4   • Smokeless tobacco: Never Used   Substance and Sexual Activity   • Alcohol use: Yes     Comment: Occasionally   • Drug use: No   • Sexual activity: Yes         Current Outpatient Medications:   •  ALPRAZolam  (XANAX) 0.25 MG tablet, Take 1 tablet by mouth Every 12 (Twelve) Hours As Needed for Anxiety., Disp: 30 tablet, Rfl: 0  •  apixaban (Eliquis) 5 MG tablet tablet, Take 1 tablet by mouth Every 12 (Twelve) Hours., Disp: 60 tablet, Rfl: 2  •  aspirin 81 MG chewable tablet, Chew 81 mg Daily., Disp: , Rfl:   •  FIBER PO, Take 1 capsule by mouth As Needed., Disp: , Rfl:   •  fluticasone (FLONASE) 50 MCG/ACT nasal spray, 1 spray into the nostril(s) as directed by provider Daily. (Patient taking differently: 1 spray into the nostril(s) as directed by provider As Needed.), Disp: 18.2 mL, Rfl: 2  •  gabapentin (NEURONTIN) 300 MG capsule, Take 1 capsule by mouth 3 (Three) Times a Day., Disp: 90 capsule, Rfl: 2  •  hydrocortisone (ANUSOL-HC) 2.5 % rectal cream, Apply rectally 2 times daily, Disp: 30 g, Rfl: 1  •  Omega-3 Fatty Acids (OMEGA 3 PO), Take 1 capsule by mouth Daily., Disp: , Rfl:   •  Zinc Acetate, Oral, (ZINC ACETATE PO), Take  by mouth., Disp: , Rfl:     Review of Systems   Constitutional: Negative for chills, fatigue and fever.   HENT: Negative for congestion, rhinorrhea and sore throat.    Respiratory: Negative for cough and shortness of breath.    Cardiovascular: Negative for chest pain and leg swelling.   Gastrointestinal: Negative for abdominal pain.   Endocrine: Negative for polydipsia and polyuria.   Genitourinary: Negative for dysuria.   Musculoskeletal: Positive for back pain. Negative for arthralgias and myalgias.   Skin: Negative for rash.   Neurological: Negative for dizziness.   Hematological: Does not bruise/bleed easily.   Psychiatric/Behavioral: Negative for dysphoric mood and sleep disturbance. The patient is nervous/anxious.        Objective   Vitals:    05/21/20 0934   BP: 142/72   Pulse: 70   Temp: 97.1 °F (36.2 °C)   SpO2: 98%     Body mass index is 42.84 kg/m².  Physical Exam   Constitutional: He is oriented to person, place, and time. He appears well-developed and well-nourished. No distress.    HENT:   Head: Normocephalic and atraumatic.   Mouth/Throat: Oropharynx is clear and moist.   Eyes: Pupils are equal, round, and reactive to light. Conjunctivae are normal.   Neck: Neck supple. No thyromegaly present.   Cardiovascular: Normal rate and regular rhythm.   No murmur heard.  Pulmonary/Chest: Effort normal and breath sounds normal.   Musculoskeletal: He exhibits no edema.   Neurological: He is alert and oriented to person, place, and time.   Skin: Skin is warm and dry.   Psychiatric: He has a normal mood and affect.         Assessment/Plan   Rashid was seen today for annual exam.    Diagnoses and all orders for this visit:    Routine health maintenance    Atypical depressive disorder    Hypercoagulable state (CMS/[facility])    High risk medication use    Chronic pain syndrome    Elevated blood pressure reading    BMI 40.0-44.9, adult (CMS/[facility])  -     CBC & Differential  -     Comprehensive Metabolic Panel  -     TSH  -     Vitamin B12  -     Lipid Panel    Family history of prostate cancer  -     PSA, Total & Free    Need for Tdap vaccination  -     Tdap Vaccine Greater Than or Equal To 8yo IM    Need for hepatitis C screening test  -     Hepatitis C Antibody    Other orders  -     Hepatitis C Antibody               Discussed the importance of maintaining a healthy weight and getting regular exercise.  Educated patient on the benefits of healthy diet.  Advise follow-up annually for wellness exams.    Patient Instructions   Work hard to lose some weight and de-stress.      Hypertension, Adult  High blood pressure (hypertension) is when the force of blood pumping through the arteries is too strong. The arteries are the blood vessels that carry blood from the heart throughout the body. Hypertension forces the heart to work harder to pump blood and may cause arteries to become narrow or stiff. Untreated or uncontrolled hypertension can cause a heart attack, heart failure, a stroke, kidney disease, and other  "problems.  A blood pressure reading consists of a higher number over a lower number. Ideally, your blood pressure should be below 120/80. The first (\"top\") number is called the systolic pressure. It is a measure of the pressure in your arteries as your heart beats. The second (\"bottom\") number is called the diastolic pressure. It is a measure of the pressure in your arteries as the heart relaxes.  What are the causes?  The exact cause of this condition is not known. There are some conditions that result in or are related to high blood pressure.  What increases the risk?  Some risk factors for high blood pressure are under your control. The following factors may make you more likely to develop this condition:  · Smoking.  · Having type 2 diabetes mellitus, high cholesterol, or both.  · Not getting enough exercise or physical activity.  · Being overweight.  · Having too much fat, sugar, calories, or salt (sodium) in your diet.  · Drinking too much alcohol.  Some risk factors for high blood pressure may be difficult or impossible to change. Some of these factors include:  · Having chronic kidney disease.  · Having a family history of high blood pressure.  · Age. Risk increases with age.  · Race. You may be at higher risk if you are .  · Gender. Men are at higher risk than women before age 45. After age 65, women are at higher risk than men.  · Having obstructive sleep apnea.  · Stress.  What are the signs or symptoms?  High blood pressure may not cause symptoms. Very high blood pressure (hypertensive crisis) may cause:  · Headache.  · Anxiety.  · Shortness of breath.  · Nosebleed.  · Nausea and vomiting.  · Vision changes.  · Severe chest pain.  · Seizures.  How is this diagnosed?  This condition is diagnosed by measuring your blood pressure while you are seated, with your arm resting on a flat surface, your legs uncrossed, and your feet flat on the floor. The cuff of the blood pressure monitor will be " placed directly against the skin of your upper arm at the level of your heart. It should be measured at least twice using the same arm. Certain conditions can cause a difference in blood pressure between your right and left arms.  Certain factors can cause blood pressure readings to be lower or higher than normal for a short period of time:  · When your blood pressure is higher when you are in a health care provider's office than when you are at home, this is called white coat hypertension. Most people with this condition do not need medicines.  · When your blood pressure is higher at home than when you are in a health care provider's office, this is called masked hypertension. Most people with this condition may need medicines to control blood pressure.  If you have a high blood pressure reading during one visit or you have normal blood pressure with other risk factors, you may be asked to:  · Return on a different day to have your blood pressure checked again.  · Monitor your blood pressure at home for 1 week or longer.  If you are diagnosed with hypertension, you may have other blood or imaging tests to help your health care provider understand your overall risk for other conditions.  How is this treated?  This condition is treated by making healthy lifestyle changes, such as eating healthy foods, exercising more, and reducing your alcohol intake. Your health care provider may prescribe medicine if lifestyle changes are not enough to get your blood pressure under control, and if:  · Your systolic blood pressure is above 130.  · Your diastolic blood pressure is above 80.  Your personal target blood pressure may vary depending on your medical conditions, your age, and other factors.  Follow these instructions at home:  Eating and drinking    · Eat a diet that is high in fiber and potassium, and low in sodium, added sugar, and fat. An example eating plan is called the DASH (Dietary Approaches to Stop Hypertension)  diet. To eat this way:  ? Eat plenty of fresh fruits and vegetables. Try to fill one half of your plate at each meal with fruits and vegetables.  ? Eat whole grains, such as whole-wheat pasta, brown rice, or whole-grain bread. Fill about one fourth of your plate with whole grains.  ? Eat or drink low-fat dairy products, such as skim milk or low-fat yogurt.  ? Avoid fatty cuts of meat, processed or cured meats, and poultry with skin. Fill about one fourth of your plate with lean proteins, such as fish, chicken without skin, beans, eggs, or tofu.  ? Avoid pre-made and processed foods. These tend to be higher in sodium, added sugar, and fat.  · Reduce your daily sodium intake. Most people with hypertension should eat less than 1,500 mg of sodium a day.  · Do not drink alcohol if:  ? Your health care provider tells you not to drink.  ? You are pregnant, may be pregnant, or are planning to become pregnant.  · If you drink alcohol:  ? Limit how much you use to:  § 0-1 drink a day for women.  § 0-2 drinks a day for men.  ? Be aware of how much alcohol is in your drink. In the [place] one drink equals one 12 oz bottle of beer (355 mL), one 5 oz glass of wine (148 mL), or one 1½ oz glass of hard liquor (44 mL).  Lifestyle    · Work with your health care provider to maintain a healthy body weight or to lose weight. Ask what an ideal weight is for you.  · Get at least 30 minutes of exercise most days of the week. Activities may include walking, swimming, or biking.  · Include exercise to strengthen your muscles (resistance exercise), such as Pilates or lifting weights, as part of your weekly exercise routine. Try to do these types of exercises for 30 minutes at least 3 days a week.  · Do not use any products that contain nicotine or tobacco, such as cigarettes, e-cigarettes, and chewing tobacco. If you need help quitting, ask your health care provider.  · Monitor your blood pressure at home as told by your health care  provider.  · Keep all follow-up visits as told by your health care provider. This is important.  Medicines  · Take over-the-counter and prescription medicines only as told by your health care provider. Follow directions carefully. Blood pressure medicines must be taken as prescribed.  · Do not skip doses of blood pressure medicine. Doing this puts you at risk for problems and can make the medicine less effective.  · Ask your health care provider about side effects or reactions to medicines that you should watch for.  Contact a health care provider if you:  · Think you are having a reaction to a medicine you are taking.  · Have headaches that keep coming back (recurring).  · Feel dizzy.  · Have swelling in your ankles.  · Have trouble with your vision.  Get help right away if you:  · Develop a severe headache or confusion.  · Have unusual weakness or numbness.  · Feel faint.  · Have severe pain in your chest or abdomen.  · Vomit repeatedly.  · Have trouble breathing.  Summary  · Hypertension is when the force of blood pumping through your arteries is too strong. If this condition is not controlled, it may put you at risk for serious complications.  · Your personal target blood pressure may vary depending on your medical conditions, your age, and other factors. For most people, a normal blood pressure is less than 120/80.  · Hypertension is treated with lifestyle changes, medicines, or a combination of both. Lifestyle changes include losing weight, eating a healthy, low-sodium diet, exercising more, and limiting alcohol.  This information is not intended to replace advice given to you by your health care provider. Make sure you discuss any questions you have with your health care provider.  Document Released: 12/18/2006 Document Revised: 08/28/2019 Document Reviewed: 08/28/2019  [employer]vier Patient Education © 2020 [employer]    Calorie Counting for Weight Loss  Calories are units of energy. Your body needs a certain  amount of calories from food to keep you going throughout the day. When you eat more calories than your body needs, your body stores the extra calories as fat. When you eat fewer calories than your body needs, your body burns fat to get the energy it needs.  Calorie counting means keeping track of how many calories you eat and drink each day. Calorie counting can be helpful if you need to lose weight. If you make sure to eat fewer calories than your body needs, you should lose weight. Ask your health care provider what a healthy weight is for you.  For calorie counting to work, you will need to eat the right number of calories in a day in order to lose a healthy amount of weight per week. A dietitian can help you determine how many calories you need in a day and will give you suggestions on how to reach your calorie goal.  · A healthy amount of weight to lose per week is usually 1-2 lb (0.5-0.9 kg). This usually means that your daily calorie intake should be reduced by 500-750 calories.  · Eating 1,200 - 1,500 calories per day can help most women lose weight.  · Eating 1,500 - 1,800 calories per day can help most men lose weight.  What is my plan?  My goal is to have __________ calories per day.  If I have this many calories per day, I should lose around __________ pounds per week.  What do I need to know about calorie counting?  In order to meet your daily calorie goal, you will need to:  · Find out how many calories are in each food you would like to eat. Try to do this before you eat.  · Decide how much of the food you plan to eat.  · Write down what you ate and how many calories it had. Doing this is called keeping a food log.  To successfully lose weight, it is important to balance calorie counting with a healthy lifestyle that includes regular activity. Aim for 150 minutes of moderate exercise (such as walking) or 75 minutes of vigorous exercise (such as running) each week.  Where do I find calorie  information?    The number of calories in a food can be found on a Nutrition Facts label. If a food does not have a Nutrition Facts label, try to look up the calories online or ask your dietitian for help.  Remember that calories are listed per serving. If you choose to have more than one serving of a food, you will have to multiply the calories per serving by the amount of servings you plan to eat. For example, the label on a package of bread might say that a serving size is 1 slice and that there are 90 calories in a serving. If you eat 1 slice, you will have eaten 90 calories. If you eat 2 slices, you will have eaten 180 calories.  How do I keep a food log?  Immediately after each meal, record the following information in your food log:  · What you ate. Don't forget to include toppings, sauces, and other extras on the food.  · How much you ate. This can be measured in cups, ounces, or number of items.  · How many calories each food and drink had.  · The total number of calories in the meal.  Keep your food log near you, such as in a small notebook in your pocket, or use a mobile tom or website. Some programs will calculate calories for you and show you how many calories you have left for the day to meet your goal.  What are some calorie counting tips?    · Use your calories on foods and drinks that will fill you up and not leave you hungry:  ? Some examples of foods that fill you up are nuts and nut butters, vegetables, lean proteins, and high-fiber foods like whole grains. High-fiber foods are foods with more than 5 g fiber per serving.  ? Drinks such as sodas, specialty coffee drinks, alcohol, and juices have a lot of calories, yet do not fill you up.  · Eat nutritious foods and avoid empty calories. Empty calories are calories you get from foods or beverages that do not have many vitamins or protein, such as candy, sweets, and soda. It is better to have a nutritious high-calorie food (such as an avocado) than  "a food with few nutrients (such as a bag of chips).  · Know how many calories are in the foods you eat most often. This will help you calculate calorie counts faster.  · Pay attention to calories in drinks. Low-calorie drinks include water and unsweetened drinks.  · Pay attention to nutrition labels for \"low fat\" or \"fat free\" foods. These foods sometimes have the same amount of calories or more calories than the full fat versions. They also often have added sugar, starch, or salt, to make up for flavor that was removed with the fat.  · Find a way of tracking calories that works for you. Get creative. Try different apps or programs if writing down calories does not work for you.  What are some portion control tips?  · Know how many calories are in a serving. This will help you know how many servings of a certain food you can have.  · Use a measuring cup to measure serving sizes. You could also try weighing out portions on a kitchen scale. With time, you will be able to estimate serving sizes for some foods.  · Take some time to put servings of different foods on your favorite plates, bowls, and cups so you know what a serving looks like.  · Try not to eat straight from a bag or box. Doing this can lead to overeating. Put the amount you would like to eat in a cup or on a plate to make sure you are eating the right portion.  · Use smaller plates, glasses, and bowls to prevent overeating.  · Try not to multitask (for example, watch TV or use your computer) while eating. If it is time to eat, sit down at a table and enjoy your food. This will help you to know when you are full. It will also help you to be aware of what you are eating and how much you are eating.  What are tips for following this plan?  Reading food labels  · Check the calorie count compared to the serving size. The serving size may be smaller than what you are used to eating.  · Check the source of the calories. Make sure the food you are eating is high " "in vitamins and protein and low in saturated and trans fats.  Shopping  · Read nutrition labels while you shop. This will help you make healthy decisions before you decide to purchase your food.  · Make a grocery list and stick to it.  Cooking  · Try to cook your favorite foods in a healthier way. For example, try baking instead of frying.  · Use low-fat dairy products.  Meal planning  · Use more fruits and vegetables. Half of your plate should be fruits and vegetables.  · Include lean proteins like poultry and fish.  How do I count calories when eating out?  · Ask for smaller portion sizes.  · Consider sharing an entree and sides instead of getting your own entree.  · If you get your own entree, eat only half. Ask for a box at the beginning of your meal and put the rest of your entree in it so you are not tempted to eat it.  · If calories are listed on the menu, choose the lower calorie options.  · Choose dishes that include vegetables, fruits, whole grains, low-fat dairy products, and lean protein.  · Choose items that are boiled, broiled, grilled, or steamed. Stay away from items that are buttered, battered, fried, or served with cream sauce. Items labeled \"crispy\" are usually fried, unless stated otherwise.  · Choose water, low-fat milk, unsweetened iced tea, or other drinks without added sugar. If you want an alcoholic beverage, choose a lower calorie option such as a glass of wine or light beer.  · Ask for dressings, sauces, and syrups on the side. These are usually high in calories, so you should limit the amount you eat.  · If you want a salad, choose a garden salad and ask for grilled meats. Avoid extra toppings like jiang, cheese, or fried items. Ask for the dressing on the side, or ask for olive oil and vinegar or lemon to use as dressing.  · Estimate how many servings of a food you are given. For example, a serving of cooked rice is ½ cup or about the size of half a baseball. Knowing serving sizes will " help you be aware of how much food you are eating at restaurants. The list below tells you how big or small some common portion sizes are based on everyday objects:  ? 1 oz--4 stacked dice.  ? 3 oz--1 deck of cards.  ? 1 tsp--1 die.  ? 1 Tbsp--½ a ping-pong ball.  ? 2 Tbsp--1 ping-pong ball.  ? ½ cup--½ baseball.  ? 1 cup--1 baseball.  Summary  · Calorie counting means keeping track of how many calories you eat and drink each day. If you eat fewer calories than your body needs, you should lose weight.  · A healthy amount of weight to lose per week is usually 1-2 lb (0.5-0.9 kg). This usually means reducing your daily calorie intake by 500-750 calories.  · The number of calories in a food can be found on a Nutrition Facts label. If a food does not have a Nutrition Facts label, try to look up the calories online or ask your dietitian for help.  · Use your calories on foods and drinks that will fill you up, and not on foods and drinks that will leave you hungry.  · Use smaller plates, glasses, and bowls to prevent overeating.  This information is not intended to replace advice given to you by your health care provider. Make sure you discuss any questions you have with your health care provider.  Document Released: 12/18/2006 Document Revised: 09/06/2019 Document Reviewed: 11/17/2017  [employer] Patient Education © 2020 [employer]

## 2020-05-21 NOTE — PATIENT INSTRUCTIONS
"Work hard to lose some weight and de-stress.      Hypertension, Adult  High blood pressure (hypertension) is when the force of blood pumping through the arteries is too strong. The arteries are the blood vessels that carry blood from the heart throughout the body. Hypertension forces the heart to work harder to pump blood and may cause arteries to become narrow or stiff. Untreated or uncontrolled hypertension can cause a heart attack, heart failure, a stroke, kidney disease, and other problems.  A blood pressure reading consists of a higher number over a lower number. Ideally, your blood pressure should be below 120/80. The first (\"top\") number is called the systolic pressure. It is a measure of the pressure in your arteries as your heart beats. The second (\"bottom\") number is called the diastolic pressure. It is a measure of the pressure in your arteries as the heart relaxes.  What are the causes?  The exact cause of this condition is not known. There are some conditions that result in or are related to high blood pressure.  What increases the risk?  Some risk factors for high blood pressure are under your control. The following factors may make you more likely to develop this condition:  · Smoking.  · Having type 2 diabetes mellitus, high cholesterol, or both.  · Not getting enough exercise or physical activity.  · Being overweight.  · Having too much fat, sugar, calories, or salt (sodium) in your diet.  · Drinking too much alcohol.  Some risk factors for high blood pressure may be difficult or impossible to change. Some of these factors include:  · Having chronic kidney disease.  · Having a family history of high blood pressure.  · Age. Risk increases with age.  · Race. You may be at higher risk if you are .  · Gender. Men are at higher risk than women before age 45. After age 65, women are at higher risk than men.  · Having obstructive sleep apnea.  · Stress.  What are the signs or " symptoms?  High blood pressure may not cause symptoms. Very high blood pressure (hypertensive crisis) may cause:  · Headache.  · Anxiety.  · Shortness of breath.  · Nosebleed.  · Nausea and vomiting.  · Vision changes.  · Severe chest pain.  · Seizures.  How is this diagnosed?  This condition is diagnosed by measuring your blood pressure while you are seated, with your arm resting on a flat surface, your legs uncrossed, and your feet flat on the floor. The cuff of the blood pressure monitor will be placed directly against the skin of your upper arm at the level of your heart. It should be measured at least twice using the same arm. Certain conditions can cause a difference in blood pressure between your right and left arms.  Certain factors can cause blood pressure readings to be lower or higher than normal for a short period of time:  · When your blood pressure is higher when you are in a health care provider's office than when you are at home, this is called white coat hypertension. Most people with this condition do not need medicines.  · When your blood pressure is higher at home than when you are in a health care provider's office, this is called masked hypertension. Most people with this condition may need medicines to control blood pressure.  If you have a high blood pressure reading during one visit or you have normal blood pressure with other risk factors, you may be asked to:  · Return on a different day to have your blood pressure checked again.  · Monitor your blood pressure at home for 1 week or longer.  If you are diagnosed with hypertension, you may have other blood or imaging tests to help your health care provider understand your overall risk for other conditions.  How is this treated?  This condition is treated by making healthy lifestyle changes, such as eating healthy foods, exercising more, and reducing your alcohol intake. Your health care provider may prescribe medicine if lifestyle changes  are not enough to get your blood pressure under control, and if:  · Your systolic blood pressure is above 130.  · Your diastolic blood pressure is above 80.  Your personal target blood pressure may vary depending on your medical conditions, your age, and other factors.  Follow these instructions at home:  Eating and drinking    · Eat a diet that is high in fiber and potassium, and low in sodium, added sugar, and fat. An example eating plan is called the DASH (Dietary Approaches to Stop Hypertension) diet. To eat this way:  ? Eat plenty of fresh fruits and vegetables. Try to fill one half of your plate at each meal with fruits and vegetables.  ? Eat whole grains, such as whole-wheat pasta, brown rice, or whole-grain bread. Fill about one fourth of your plate with whole grains.  ? Eat or drink low-fat dairy products, such as skim milk or low-fat yogurt.  ? Avoid fatty cuts of meat, processed or cured meats, and poultry with skin. Fill about one fourth of your plate with lean proteins, such as fish, chicken without skin, beans, eggs, or tofu.  ? Avoid pre-made and processed foods. These tend to be higher in sodium, added sugar, and fat.  · Reduce your daily sodium intake. Most people with hypertension should eat less than 1,500 mg of sodium a day.  · Do not drink alcohol if:  ? Your health care provider tells you not to drink.  ? You are pregnant, may be pregnant, or are planning to become pregnant.  · If you drink alcohol:  ? Limit how much you use to:  § 0-1 drink a day for women.  § 0-2 drinks a day for men.  ? Be aware of how much alcohol is in your drink. In the [place] one drink equals one 12 oz bottle of beer (355 mL), one 5 oz glass of wine (148 mL), or one 1½ oz glass of hard liquor (44 mL).  Lifestyle    · Work with your health care provider to maintain a healthy body weight or to lose weight. Ask what an ideal weight is for you.  · Get at least 30 minutes of exercise most days of the week. Activities may  include walking, swimming, or biking.  · Include exercise to strengthen your muscles (resistance exercise), such as Pilates or lifting weights, as part of your weekly exercise routine. Try to do these types of exercises for 30 minutes at least 3 days a week.  · Do not use any products that contain nicotine or tobacco, such as cigarettes, e-cigarettes, and chewing tobacco. If you need help quitting, ask your health care provider.  · Monitor your blood pressure at home as told by your health care provider.  · Keep all follow-up visits as told by your health care provider. This is important.  Medicines  · Take over-the-counter and prescription medicines only as told by your health care provider. Follow directions carefully. Blood pressure medicines must be taken as prescribed.  · Do not skip doses of blood pressure medicine. Doing this puts you at risk for problems and can make the medicine less effective.  · Ask your health care provider about side effects or reactions to medicines that you should watch for.  Contact a health care provider if you:  · Think you are having a reaction to a medicine you are taking.  · Have headaches that keep coming back (recurring).  · Feel dizzy.  · Have swelling in your ankles.  · Have trouble with your vision.  Get help right away if you:  · Develop a severe headache or confusion.  · Have unusual weakness or numbness.  · Feel faint.  · Have severe pain in your chest or abdomen.  · Vomit repeatedly.  · Have trouble breathing.  Summary  · Hypertension is when the force of blood pumping through your arteries is too strong. If this condition is not controlled, it may put you at risk for serious complications.  · Your personal target blood pressure may vary depending on your medical conditions, your age, and other factors. For most people, a normal blood pressure is less than 120/80.  · Hypertension is treated with lifestyle changes, medicines, or a combination of both. Lifestyle changes  include losing weight, eating a healthy, low-sodium diet, exercising more, and limiting alcohol.  This information is not intended to replace advice given to you by your health care provider. Make sure you discuss any questions you have with your health care provider.  Document Released: 12/18/2006 Document Revised: 08/28/2019 Document Reviewed: 08/28/2019  [employer] Patient Education © 2020 [employer]    Calorie Counting for Weight Loss  Calories are units of energy. Your body needs a certain amount of calories from food to keep you going throughout the day. When you eat more calories than your body needs, your body stores the extra calories as fat. When you eat fewer calories than your body needs, your body burns fat to get the energy it needs.  Calorie counting means keeping track of how many calories you eat and drink each day. Calorie counting can be helpful if you need to lose weight. If you make sure to eat fewer calories than your body needs, you should lose weight. Ask your health care provider what a healthy weight is for you.  For calorie counting to work, you will need to eat the right number of calories in a day in order to lose a healthy amount of weight per week. A dietitian can help you determine how many calories you need in a day and will give you suggestions on how to reach your calorie goal.  · A healthy amount of weight to lose per week is usually 1-2 lb (0.5-0.9 kg). This usually means that your daily calorie intake should be reduced by 500-750 calories.  · Eating 1,200 - 1,500 calories per day can help most women lose weight.  · Eating 1,500 - 1,800 calories per day can help most men lose weight.  What is my plan?  My goal is to have __________ calories per day.  If I have this many calories per day, I should lose around __________ pounds per week.  What do I need to know about calorie counting?  In order to meet your daily calorie goal, you will need to:  · Find out how many calories are in  each food you would like to eat. Try to do this before you eat.  · Decide how much of the food you plan to eat.  · Write down what you ate and how many calories it had. Doing this is called keeping a food log.  To successfully lose weight, it is important to balance calorie counting with a healthy lifestyle that includes regular activity. Aim for 150 minutes of moderate exercise (such as walking) or 75 minutes of vigorous exercise (such as running) each week.  Where do I find calorie information?    The number of calories in a food can be found on a Nutrition Facts label. If a food does not have a Nutrition Facts label, try to look up the calories online or ask your dietitian for help.  Remember that calories are listed per serving. If you choose to have more than one serving of a food, you will have to multiply the calories per serving by the amount of servings you plan to eat. For example, the label on a package of bread might say that a serving size is 1 slice and that there are 90 calories in a serving. If you eat 1 slice, you will have eaten 90 calories. If you eat 2 slices, you will have eaten 180 calories.  How do I keep a food log?  Immediately after each meal, record the following information in your food log:  · What you ate. Don't forget to include toppings, sauces, and other extras on the food.  · How much you ate. This can be measured in cups, ounces, or number of items.  · How many calories each food and drink had.  · The total number of calories in the meal.  Keep your food log near you, such as in a small notebook in your pocket, or use a mobile tom or website. Some programs will calculate calories for you and show you how many calories you have left for the day to meet your goal.  What are some calorie counting tips?    · Use your calories on foods and drinks that will fill you up and not leave you hungry:  ? Some examples of foods that fill you up are nuts and nut butters, vegetables, lean  "proteins, and high-fiber foods like whole grains. High-fiber foods are foods with more than 5 g fiber per serving.  ? Drinks such as sodas, specialty coffee drinks, alcohol, and juices have a lot of calories, yet do not fill you up.  · Eat nutritious foods and avoid empty calories. Empty calories are calories you get from foods or beverages that do not have many vitamins or protein, such as candy, sweets, and soda. It is better to have a nutritious high-calorie food (such as an avocado) than a food with few nutrients (such as a bag of chips).  · Know how many calories are in the foods you eat most often. This will help you calculate calorie counts faster.  · Pay attention to calories in drinks. Low-calorie drinks include water and unsweetened drinks.  · Pay attention to nutrition labels for \"low fat\" or \"fat free\" foods. These foods sometimes have the same amount of calories or more calories than the full fat versions. They also often have added sugar, starch, or salt, to make up for flavor that was removed with the fat.  · Find a way of tracking calories that works for you. Get creative. Try different apps or programs if writing down calories does not work for you.  What are some portion control tips?  · Know how many calories are in a serving. This will help you know how many servings of a certain food you can have.  · Use a measuring cup to measure serving sizes. You could also try weighing out portions on a kitchen scale. With time, you will be able to estimate serving sizes for some foods.  · Take some time to put servings of different foods on your favorite plates, bowls, and cups so you know what a serving looks like.  · Try not to eat straight from a bag or box. Doing this can lead to overeating. Put the amount you would like to eat in a cup or on a plate to make sure you are eating the right portion.  · Use smaller plates, glasses, and bowls to prevent overeating.  · Try not to multitask (for example, watch " "TV or use your computer) while eating. If it is time to eat, sit down at a table and enjoy your food. This will help you to know when you are full. It will also help you to be aware of what you are eating and how much you are eating.  What are tips for following this plan?  Reading food labels  · Check the calorie count compared to the serving size. The serving size may be smaller than what you are used to eating.  · Check the source of the calories. Make sure the food you are eating is high in vitamins and protein and low in saturated and trans fats.  Shopping  · Read nutrition labels while you shop. This will help you make healthy decisions before you decide to purchase your food.  · Make a grocery list and stick to it.  Cooking  · Try to cook your favorite foods in a healthier way. For example, try baking instead of frying.  · Use low-fat dairy products.  Meal planning  · Use more fruits and vegetables. Half of your plate should be fruits and vegetables.  · Include lean proteins like poultry and fish.  How do I count calories when eating out?  · Ask for smaller portion sizes.  · Consider sharing an entree and sides instead of getting your own entree.  · If you get your own entree, eat only half. Ask for a box at the beginning of your meal and put the rest of your entree in it so you are not tempted to eat it.  · If calories are listed on the menu, choose the lower calorie options.  · Choose dishes that include vegetables, fruits, whole grains, low-fat dairy products, and lean protein.  · Choose items that are boiled, broiled, grilled, or steamed. Stay away from items that are buttered, battered, fried, or served with cream sauce. Items labeled \"crispy\" are usually fried, unless stated otherwise.  · Choose water, low-fat milk, unsweetened iced tea, or other drinks without added sugar. If you want an alcoholic beverage, choose a lower calorie option such as a glass of wine or light beer.  · Ask for dressings, " sauces, and syrups on the side. These are usually high in calories, so you should limit the amount you eat.  · If you want a salad, choose a garden salad and ask for grilled meats. Avoid extra toppings like jiang, cheese, or fried items. Ask for the dressing on the side, or ask for olive oil and vinegar or lemon to use as dressing.  · Estimate how many servings of a food you are given. For example, a serving of cooked rice is ½ cup or about the size of half a baseball. Knowing serving sizes will help you be aware of how much food you are eating at restaurants. The list below tells you how big or small some common portion sizes are based on everyday objects:  ? 1 oz--4 stacked dice.  ? 3 oz--1 deck of cards.  ? 1 tsp--1 die.  ? 1 Tbsp--½ a ping-pong ball.  ? 2 Tbsp--1 ping-pong ball.  ? ½ cup--½ baseball.  ? 1 cup--1 baseball.  Summary  · Calorie counting means keeping track of how many calories you eat and drink each day. If you eat fewer calories than your body needs, you should lose weight.  · A healthy amount of weight to lose per week is usually 1-2 lb (0.5-0.9 kg). This usually means reducing your daily calorie intake by 500-750 calories.  · The number of calories in a food can be found on a Nutrition Facts label. If a food does not have a Nutrition Facts label, try to look up the calories online or ask your dietitian for help.  · Use your calories on foods and drinks that will fill you up, and not on foods and drinks that will leave you hungry.  · Use smaller plates, glasses, and bowls to prevent overeating.  This information is not intended to replace advice given to you by your health care provider. Make sure you discuss any questions you have with your health care provider.  Document Released: 12/18/2006 Document Revised: 09/06/2019 Document Reviewed: 11/17/2017  [employer] Patient Education © 2020 [employer]

## 2020-05-22 LAB
ALBUMIN SERPL-MCNC: 4.3 G/DL (ref 3.5–5.2)
ALBUMIN/GLOB SERPL: 1.5 G/DL
ALP SERPL-CCNC: 73 U/L (ref 39–117)
ALT SERPL-CCNC: 22 U/L (ref 1–41)
AST SERPL-CCNC: 22 U/L (ref 1–40)
BASOPHILS # BLD AUTO: 0.04 10*3/MM3 (ref 0–0.2)
BASOPHILS NFR BLD AUTO: 0.7 % (ref 0–1.5)
BILIRUB SERPL-MCNC: 0.6 MG/DL (ref 0.2–1.2)
BUN SERPL-MCNC: 15 MG/DL (ref 6–20)
BUN/CREAT SERPL: 18.5 (ref 7–25)
CALCIUM SERPL-MCNC: 9.6 MG/DL (ref 8.6–10.5)
CHLORIDE SERPL-SCNC: 102 MMOL/L (ref 98–107)
CHOLEST SERPL-MCNC: 182 MG/DL (ref 0–200)
CO2 SERPL-SCNC: 28.6 MMOL/L (ref 22–29)
CREAT SERPL-MCNC: 0.81 MG/DL (ref 0.76–1.27)
EOSINOPHIL # BLD AUTO: 0.27 10*3/MM3 (ref 0–0.4)
EOSINOPHIL NFR BLD AUTO: 4.7 % (ref 0.3–6.2)
ERYTHROCYTE [DISTWIDTH] IN BLOOD BY AUTOMATED COUNT: 12.3 % (ref 12.3–15.4)
GLOBULIN SER CALC-MCNC: 2.9 GM/DL
GLUCOSE SERPL-MCNC: 104 MG/DL (ref 65–99)
HCT VFR BLD AUTO: 46.4 % (ref 37.5–51)
HCV AB S/CO SERPL IA: 0.3 S/CO RATIO (ref 0–0.9)
HDLC SERPL-MCNC: 44 MG/DL (ref 40–60)
HGB BLD-MCNC: 16 G/DL (ref 13–17.7)
IMM GRANULOCYTES # BLD AUTO: 0.01 10*3/MM3 (ref 0–0.05)
IMM GRANULOCYTES NFR BLD AUTO: 0.2 % (ref 0–0.5)
LDLC SERPL CALC-MCNC: 122 MG/DL (ref 0–100)
LYMPHOCYTES # BLD AUTO: 1.78 10*3/MM3 (ref 0.7–3.1)
LYMPHOCYTES NFR BLD AUTO: 30.8 % (ref 19.6–45.3)
MCH RBC QN AUTO: 30.8 PG (ref 26.6–33)
MCHC RBC AUTO-ENTMCNC: 34.5 G/DL (ref 31.5–35.7)
MCV RBC AUTO: 89.4 FL (ref 79–97)
MONOCYTES # BLD AUTO: 0.42 10*3/MM3 (ref 0.1–0.9)
MONOCYTES NFR BLD AUTO: 7.3 % (ref 5–12)
NEUTROPHILS # BLD AUTO: 3.26 10*3/MM3 (ref 1.7–7)
NEUTROPHILS NFR BLD AUTO: 56.3 % (ref 42.7–76)
NRBC BLD AUTO-RTO: 0 /100 WBC (ref 0–0.2)
PLATELET # BLD AUTO: 258 10*3/MM3 (ref 140–450)
POTASSIUM SERPL-SCNC: 4.8 MMOL/L (ref 3.5–5.2)
PROT SERPL-MCNC: 7.2 G/DL (ref 6–8.5)
PSA FREE MFR SERPL: 20 %
PSA FREE SERPL-MCNC: 0.22 NG/ML
PSA SERPL-MCNC: 1.1 NG/ML (ref 0–4)
RBC # BLD AUTO: 5.19 10*6/MM3 (ref 4.14–5.8)
SODIUM SERPL-SCNC: 138 MMOL/L (ref 136–145)
TRIGL SERPL-MCNC: 80 MG/DL (ref 0–150)
TSH SERPL DL<=0.005 MIU/L-ACNC: 2.39 UIU/ML (ref 0.27–4.2)
VIT B12 SERPL-MCNC: 267 PG/ML (ref 211–946)
VLDLC SERPL CALC-MCNC: 16 MG/DL (ref 5–40)
WBC # BLD AUTO: 5.78 10*3/MM3 (ref 3.4–10.8)

## 2020-06-16 DIAGNOSIS — G89.4 CHRONIC PAIN SYNDROME: ICD-10-CM

## 2020-06-17 RX ORDER — GABAPENTIN 300 MG/1
CAPSULE ORAL
Qty: 90 CAPSULE | Refills: 1 | Status: SHIPPED | OUTPATIENT
Start: 2020-06-17 | End: 2020-09-23

## 2020-06-17 NOTE — TELEPHONE ENCOUNTER
Pt requesting refill on gabapentin 300 mg tid  siva 05/18/2020  Contract 10/01/2019  Last fill 02/20/2020  uds 02/20/2020  LOV 05/21/2020  [facility]

## 2020-07-16 RX ORDER — APIXABAN 5 MG/1
TABLET, FILM COATED ORAL
Qty: 60 TABLET | Refills: 1 | Status: SHIPPED | OUTPATIENT
Start: 2020-07-16 | End: 2020-09-23

## 2020-07-23 ENCOUNTER — TELEPHONE (OUTPATIENT)
Dept: FAMILY MEDICINE CLINIC | Facility: CLINIC | Age: 55
End: 2020-07-23

## 2020-07-27 ENCOUNTER — TELEMEDICINE (OUTPATIENT)
Dept: FAMILY MEDICINE CLINIC | Facility: CLINIC | Age: 55
End: 2020-07-27

## 2020-07-27 DIAGNOSIS — J01.11 ACUTE RECURRENT FRONTAL SINUSITIS: Primary | ICD-10-CM

## 2020-07-27 PROCEDURE — 99213 OFFICE O/P EST LOW 20 MIN: CPT | Performed by: FAMILY MEDICINE

## 2020-07-27 RX ORDER — CEFDINIR 300 MG/1
300 CAPSULE ORAL 2 TIMES DAILY
Qty: 20 CAPSULE | Refills: 0 | Status: SHIPPED | OUTPATIENT
Start: 2020-07-27 | End: 2020-08-25

## 2020-07-27 RX ORDER — PREDNISONE 20 MG/1
40 TABLET ORAL DAILY
Qty: 10 TABLET | Refills: 0 | OUTPATIENT
Start: 2020-07-27 | End: 2020-07-31

## 2020-07-27 NOTE — PROGRESS NOTES
Subjective   Rashid Maldonado is a 55 y.o. male.     Chief Complaint   Patient presents with   • Fever     LOW GRADE FEVER OFF AND ON FOR THE LAST WEEK. HIS FATHER RECENTLY TESTED POSITIVE FOR COVID   • Cough     You have chosen to receive care through a telehealth visit.  Do you consent to use a video/audio connection for your medical care today? YES    Patient requested a video visit today during the coronavirus pandemic to discuss some recurrent symptoms he was having.  He states that it all started about a month ago with what was treated as a sinus infection.  He was seen at the urgent care center a COVID-19 test was obtained which was negative and he was started on Augmentin for what was thought to be a sinus infection.  He never improved after that and now continues with sinus pressure, temperatures of , runny nose, headache, sore throat, postnasal drip, congestion, and cough.  He states that a year ago when he had this it took an antibiotic and a steroid to finally cleared up.       Review of Systems   Constitutional: Negative for activity change, chills, fatigue and fever.   HENT: Positive for congestion, postnasal drip, rhinorrhea, sinus pressure and sore throat. Negative for hearing loss, swollen glands, tinnitus and trouble swallowing.    Eyes: Positive for discharge. Negative for pain and visual disturbance.   Respiratory: Positive for cough. Negative for shortness of breath.    Cardiovascular: Negative for chest pain, palpitations and leg swelling.   Gastrointestinal: Negative for diarrhea and nausea.   Endocrine: Negative for polydipsia and polyuria.   Genitourinary: Negative for difficulty urinating and urinary incontinence.   Musculoskeletal: Negative for arthralgias, gait problem and joint swelling.   Skin: Negative for rash.   Allergic/Immunologic: Negative for immunocompromised state.   Neurological: Negative for dizziness, light-headedness and headache.   Hematological: Negative for  adenopathy. Does not bruise/bleed easily.   Psychiatric/Behavioral: Negative for dysphoric mood and sleep disturbance.       The following portions of the patient's history were reviewed and updated as appropriate: allergies, current medications, past family history, past medical history, past social history, past surgical history and problem list.    Past Medical History:   Diagnosis Date   • Abdominal pain, LLQ    • Abnormal findings on diagnostic imaging of abdomen    • Acute deep vein thrombosis of lower extremity (CMS/HCC)    • Acute sinusitis    • Anxiety and depression    • Attention deficit disorder    • Atypical depressive disorder    • Bloating    • BMI 40.0-44.9, adult (CMS/HCC)    • Chest pain    • Chronic pain syndrome    • Colitis    • COPD (chronic obstructive pulmonary disease) (CMS/HCC)    • Cramps, muscle, general    • Dehydration    • Depression    • Diarrhea    • Difficulty breathing    • Disc degeneration, lumbosacral    • DVT (deep venous thrombosis) (CMS/HCC)     left leg   • DVT, lower extremity, recurrent (CMS/HCC)    • Edema    • Fatigue    • Fever    • GERD (gastroesophageal reflux disease)    • Heartburn    • Hyperlipidemia    • Idiopathic peripheral neuropathy    • Idiopathic urticaria    • Inflamed skin tag    • Insomnia    • Joint pain    • Long-term use of high-risk medication    • Low back pain    • Lumbago    • Lumbosacral neuritis    • Lymphadenitis    • Melanocytic nevus    • Nausea    • Neoplasm of uncertain behavior of skin    • Neuralgia    • Neuritis    • Organic impotence    • Osteoarthritis, multiple sites    • Panic disorder without agoraphobia    • Prothrombin mutation (CMS/HCC)    • Pulmonary embolism (CMS/HCC)     bilateral lungs   • Shingles    • Shortness of breath        Past Surgical History:   Procedure Laterality Date   • BACK SURGERY N/A 2015    done at [facility] in [place], [place]   • COLONOSCOPY N/A 1/24/2017    Procedure: COLONOSCOPY to Cecum;  Surgeon: Denton  SHAHRAM Matthews MD;  Location: [facility] ENDOSCOPY;  Service:    • ENDOSCOPY N/A 2017    Procedure: ESOPHAGOGASTRODUODENOSCOPY with Bx's;  Surgeon: Denton Matthews MD;  Location: [facility] ENDOSCOPY;  Service:    • KNEE SURGERY Left     Dr. Gloria   • KNEE SURGERY Right    • OTHER SURGICAL HISTORY      post spinal diskect osteophytect lumb interspace microdiscec       Family History   Problem Relation Age of Onset   • Hypertension Mother    • Lung cancer Mother    • Anxiety disorder Mother    • Depression Mother    • Heart disease Mother    • Leukemia Paternal Uncle    • Diabetes Paternal Grandmother    • Prostate cancer Paternal Grandfather    • Hypothyroidism Father    • Prostate cancer Maternal Grandfather        Social History     Socioeconomic History   • Marital status:      Spouse name: Yasmin   • Number of children: Not on file   • Years of education: Not on file   • Highest education level: Not on file   Occupational History     Employer: SELF-EMPLOYED   Tobacco Use   • Smoking status: Former Smoker     Years: 17.00     Types: Cigarettes     Last attempt to quit: 2000     Years since quittin.5   • Smokeless tobacco: Never Used   Substance and Sexual Activity   • Alcohol use: Yes     Comment: Occasionally   • Drug use: No   • Sexual activity: Yes         Current Outpatient Medications:   •  ALPRAZolam (XANAX) 0.25 MG tablet, Take 1 tablet by mouth Every 12 (Twelve) Hours As Needed for Anxiety., Disp: 30 tablet, Rfl: 0  •  aspirin 81 MG chewable tablet, Chew 81 mg Daily., Disp: , Rfl:   •  ELIQUIS 5 MG tablet tablet, TAKE ONE TABLET BY MOUTH EVERY 12 HOURS, Disp: 60 tablet, Rfl: 1  •  FIBER PO, Take 1 capsule by mouth As Needed., Disp: , Rfl:   •  fluticasone (FLONASE) 50 MCG/ACT nasal spray, 1 spray into the nostril(s) as directed by provider Daily. (Patient taking differently: 1 spray into the nostril(s) as directed by provider As Needed.), Disp: 18.2 mL, Rfl: 2  •  gabapentin (NEURONTIN) 300  MG capsule, TAKE ONE CAPSULE BY MOUTH THREE TIMES A DAY, Disp: 90 capsule, Rfl: 1  •  hydrocortisone (ANUSOL-HC) 2.5 % rectal cream, Apply rectally 2 times daily, Disp: 30 g, Rfl: 1  •  ketorolac (ACULAR) 0.5 % ophthalmic solution, Administer 1 drop to the right eye 4 (Four) Times a Day., Disp: 10 mL, Rfl: 0  •  Omega-3 Fatty Acids (OMEGA 3 PO), Take 1 capsule by mouth Daily., Disp: , Rfl:   •  Zinc Acetate, Oral, (ZINC ACETATE PO), Take  by mouth., Disp: , Rfl:   •  cefdinir (OMNICEF) 300 MG capsule, Take 1 capsule by mouth 2 (Two) Times a Day., Disp: 20 capsule, Rfl: 0  •  predniSONE (DELTASONE) 20 MG tablet, Take 2 tablets by mouth Daily. With food, Disp: 10 tablet, Rfl: 0    Objective     There were no vitals filed for this visit.    There is no height or weight on file to calculate BMI.    No components found for: 2D    Physical Exam   Constitutional: He is oriented to person, place, and time. He appears well-developed and well-nourished.   HENT:   Head: Normocephalic and atraumatic.   Eyes: Conjunctivae are normal. No scleral icterus.   Neck: Normal range of motion.   Pulmonary/Chest: Effort normal.   Neurological: He is alert and oriented to person, place, and time.   Psychiatric: He has a normal mood and affect. His behavior is normal. Judgment and thought content normal.       Procedures    Assessment/Plan   Rashid was seen today for fever and cough.    Diagnoses and all orders for this visit:    Acute recurrent frontal sinusitis  -     cefdinir (OMNICEF) 300 MG capsule; Take 1 capsule by mouth 2 (Two) Times a Day.  -     predniSONE (DELTASONE) 20 MG tablet; Take 2 tablets by mouth Daily. With food    I advised the patient to push fluids.  Get rest.  Stay home.  He was advised to follow the guidelines of social distancing, wearing a facemask and good hand hygiene.  This was an audio and video enabled telemedicine encounter, lasting 16 minutes all in direct audio and video contact with the patient   There are  no Patient Instructions on file for this visit.

## 2020-08-25 ENCOUNTER — OFFICE VISIT (OUTPATIENT)
Dept: FAMILY MEDICINE CLINIC | Facility: CLINIC | Age: 55
End: 2020-08-25

## 2020-08-25 VITALS
HEART RATE: 67 BPM | DIASTOLIC BLOOD PRESSURE: 88 MMHG | TEMPERATURE: 97.1 F | OXYGEN SATURATION: 99 % | SYSTOLIC BLOOD PRESSURE: 148 MMHG | HEIGHT: 70 IN | WEIGHT: 293.6 LBS | BODY MASS INDEX: 42.03 KG/M2

## 2020-08-25 DIAGNOSIS — Z23 NEED FOR PNEUMOCOCCAL VACCINE: ICD-10-CM

## 2020-08-25 DIAGNOSIS — D68.59 HYPERCOAGULABLE STATE (HCC): ICD-10-CM

## 2020-08-25 DIAGNOSIS — F32.89 ATYPICAL DEPRESSIVE DISORDER: Primary | ICD-10-CM

## 2020-08-25 DIAGNOSIS — F41.0 PANIC DISORDER WITHOUT AGORAPHOBIA: ICD-10-CM

## 2020-08-25 DIAGNOSIS — R03.0 ELEVATED BLOOD PRESSURE READING: ICD-10-CM

## 2020-08-25 DIAGNOSIS — I82.409 RECURRENT DEEP VEIN THROMBOSIS (DVT) OF LOWER EXTREMITY, UNSPECIFIED LATERALITY (HCC): ICD-10-CM

## 2020-08-25 DIAGNOSIS — Z79.899 HIGH RISK MEDICATION USE: ICD-10-CM

## 2020-08-25 DIAGNOSIS — G89.4 CHRONIC PAIN SYNDROME: ICD-10-CM

## 2020-08-25 PROCEDURE — 90732 PPSV23 VACC 2 YRS+ SUBQ/IM: CPT | Performed by: FAMILY MEDICINE

## 2020-08-25 PROCEDURE — 90471 IMMUNIZATION ADMIN: CPT | Performed by: FAMILY MEDICINE

## 2020-08-25 PROCEDURE — 99214 OFFICE O/P EST MOD 30 MIN: CPT | Performed by: FAMILY MEDICINE

## 2020-08-25 RX ORDER — ALPRAZOLAM 0.25 MG/1
0.25 TABLET ORAL EVERY 12 HOURS PRN
Qty: 30 TABLET | Refills: 0 | Status: SHIPPED | OUTPATIENT
Start: 2020-08-25 | End: 2023-02-22

## 2020-08-25 NOTE — PROGRESS NOTES
Subjective   Rashid Maldonado is a 55 y.o. male.     Chief Complaint   Patient presents with   • Hyperlipidemia   • Hypertension        Patient presents for his routine follow-up on his chronic pain from his back, depression, anxiety and elevated blood pressure.  He still doing well on his current regimen of gabapentin.  He has had a little bit of anxiety after getting over COVID last month and needs a refill of his alprazolam.  30 pills usually lasts him a long time.  His blood pressure has been running better at home.  He denies any other new symptoms today.         The following portions of the patient's history were reviewed and updated as appropriate: allergies, current medications, past family history, past medical history, past social history, past surgical history and problem list.    Past Medical History:   Diagnosis Date   • Abdominal pain, LLQ    • Abnormal findings on diagnostic imaging of abdomen    • Acute deep vein thrombosis of lower extremity (CMS/HCC)    • Acute sinusitis    • Anxiety and depression    • Attention deficit disorder    • Atypical depressive disorder    • Bloating    • BMI 40.0-44.9, adult (CMS/HCC)    • Chest pain    • Chronic pain syndrome    • Colitis    • COPD (chronic obstructive pulmonary disease) (CMS/HCC)    • Cramps, muscle, general    • Dehydration    • Depression    • Diarrhea    • Difficulty breathing    • Disc degeneration, lumbosacral    • DVT (deep venous thrombosis) (CMS/HCC)     left leg   • DVT, lower extremity, recurrent (CMS/HCC)    • Edema    • Fatigue    • Fever    • GERD (gastroesophageal reflux disease)    • Heartburn    • Hyperlipidemia    • Idiopathic peripheral neuropathy    • Idiopathic urticaria    • Inflamed skin tag    • Insomnia    • Joint pain    • Long-term use of high-risk medication    • Low back pain    • Lumbago    • Lumbosacral neuritis    • Lymphadenitis    • Melanocytic nevus    • Nausea    • Neoplasm of uncertain behavior of skin    • Neuralgia     • Neuritis    • Organic impotence    • Osteoarthritis, multiple sites    • Panic disorder without agoraphobia    • Prothrombin mutation (CMS/HCC)    • Pulmonary embolism (CMS/HCC)     bilateral lungs   • Shingles    • Shortness of breath        Past Surgical History:   Procedure Laterality Date   • BACK SURGERY N/A     done at [facility] in [place], [place]   • COLONOSCOPY N/A 2017    Procedure: COLONOSCOPY to Cecum;  Surgeon: Denton Matthews MD;  Location: [facility] ENDOSCOPY;  Service:    • ENDOSCOPY N/A 2017    Procedure: ESOPHAGOGASTRODUODENOSCOPY with Bx's;  Surgeon: Denton Matthews MD;  Location: [facility] ENDOSCOPY;  Service:    • KNEE SURGERY Left     Dr. Gloria   • KNEE SURGERY Right    • OTHER SURGICAL HISTORY      post spinal diskect osteophytect lumb interspace microdiscec       Family History   Problem Relation Age of Onset   • Hypertension Mother    • Lung cancer Mother    • Anxiety disorder Mother    • Depression Mother    • Heart disease Mother    • Leukemia Paternal Uncle    • Diabetes Paternal Grandmother    • Prostate cancer Paternal Grandfather    • Hypothyroidism Father    • Prostate cancer Maternal Grandfather        Social History     Socioeconomic History   • Marital status:      Spouse name: Yasmin   • Number of children: Not on file   • Years of education: Not on file   • Highest education level: Not on file   Occupational History     Employer: SELF-EMPLOYED   Tobacco Use   • Smoking status: Former Smoker     Years: 17.00     Types: Cigarettes     Last attempt to quit: 2000     Years since quittin.6   • Smokeless tobacco: Never Used   Substance and Sexual Activity   • Alcohol use: Yes     Comment: Occasionally   • Drug use: No   • Sexual activity: Yes         Current Outpatient Medications:   •  ALPRAZolam (XANAX) 0.25 MG tablet, Take 1 tablet by mouth Every 12 (Twelve) Hours As Needed for Anxiety., Disp: 30 tablet, Rfl: 0  •  aspirin 81 MG chewable tablet,  "Chew 81 mg Daily., Disp: , Rfl:   •  ELIQUIS 5 MG tablet tablet, TAKE ONE TABLET BY MOUTH EVERY 12 HOURS, Disp: 60 tablet, Rfl: 1  •  FIBER PO, Take 1 capsule by mouth As Needed., Disp: , Rfl:   •  fluticasone (FLONASE) 50 MCG/ACT nasal spray, 1 spray into the nostril(s) as directed by provider Daily. (Patient taking differently: 1 spray into the nostril(s) as directed by provider As Needed.), Disp: 18.2 mL, Rfl: 2  •  gabapentin (NEURONTIN) 300 MG capsule, TAKE ONE CAPSULE BY MOUTH THREE TIMES A DAY, Disp: 90 capsule, Rfl: 1  •  ketorolac (ACULAR) 0.5 % ophthalmic solution, Administer 1 drop to the right eye 4 (Four) Times a Day., Disp: 10 mL, Rfl: 0  •  Omega-3 Fatty Acids (OMEGA 3 PO), Take 1 capsule by mouth Daily., Disp: , Rfl:   •  Zinc Acetate, Oral, (ZINC ACETATE PO), Take  by mouth., Disp: , Rfl:   •  hydrocortisone (ANUSOL-HC) 2.5 % rectal cream, Apply rectally 2 times daily, Disp: 30 g, Rfl: 1    Review of Systems   Constitutional: Negative for chills, fatigue and fever.   HENT: Negative for congestion, rhinorrhea and sore throat.    Respiratory: Negative for cough and shortness of breath.    Cardiovascular: Negative for chest pain and leg swelling.   Gastrointestinal: Negative for abdominal pain.   Endocrine: Negative for polydipsia and polyuria.   Genitourinary: Negative for dysuria.   Musculoskeletal: Positive for back pain. Negative for arthralgias and myalgias.   Skin: Negative for rash.   Neurological: Negative for dizziness.   Hematological: Does not bruise/bleed easily.   Psychiatric/Behavioral: Positive for dysphoric mood. Negative for decreased concentration, self-injury, sleep disturbance and suicidal ideas. The patient is not nervous/anxious.        Objective   Vitals:    08/25/20 0917   BP: 148/88   Pulse: 67   Temp: 97.1 °F (36.2 °C)   SpO2: 99%   Weight: 133 kg (293 lb 9.6 oz)   Height: 177.8 cm (70\")     Body mass index is 42.13 kg/m².  Physical Exam   Constitutional: He is oriented to " person, place, and time. He appears well-developed and well-nourished. No distress.   HENT:   Head: Normocephalic and atraumatic.   Mouth/Throat: Oropharynx is clear and moist.   Eyes: Pupils are equal, round, and reactive to light. Conjunctivae are normal.   Neck: Neck supple. No thyromegaly present.   Cardiovascular: Normal rate and regular rhythm.   No murmur heard.  Pulmonary/Chest: Effort normal and breath sounds normal.   Musculoskeletal: He exhibits no edema.   Neurological: He is alert and oriented to person, place, and time.   Skin: Skin is warm and dry.   Psychiatric: He has a normal mood and affect.         Assessment/Plan   Rashid was seen today for hyperlipidemia and hypertension.    Diagnoses and all orders for this visit:    Atypical depressive disorder    High risk medication use  -     Drug Profile 248643 - Urine, Clean Catch    Elevated blood pressure reading    Chronic pain syndrome    Recurrent deep vein thrombosis (DVT) of lower extremity, unspecified laterality (CMS/HCC)    Hypercoagulable state (CMS/HCC)    Need for pneumococcal vaccine  -     Pneumococcal polysaccharide vaccine 23-valent >= 3yo subcutaneous/IM (PPSV23)    Panic disorder without agoraphobia  -     ALPRAZolam (XANAX) 0.25 MG tablet; Take 1 tablet by mouth Every 12 (Twelve) Hours As Needed for Anxiety.               There are no Patient Instructions on file for this visit.

## 2020-08-26 LAB
AMPHETAMINES UR QL SCN: NEGATIVE NG/ML
BARBITURATES UR QL: NEGATIVE NG/ML
BENZODIAZ UR QL SCN: NEGATIVE NG/ML
BZE UR QL: NEGATIVE NG/ML
CANNABINOIDS UR QL SCN: NEGATIVE NG/ML
CREAT UR-MCNC: 108.1 MG/DL (ref 20–300)
ETHANOL UR-MCNC: NEGATIVE %
MEPERIDINE UR QL: NEGATIVE NG/ML
METHADONE UR QL: NEGATIVE NG/ML
OPIATES UR QL SCN: NEGATIVE NG/ML
OXYCODONE+OXYMORPHONE UR QL SCN: NEGATIVE NG/ML
PCP UR QL: NEGATIVE NG/ML
PROPOXYPH UR QL: NEGATIVE NG/ML
TRAMADOL UR QL SCN: NEGATIVE NG/ML

## 2020-09-23 DIAGNOSIS — G89.4 CHRONIC PAIN SYNDROME: ICD-10-CM

## 2020-09-23 RX ORDER — APIXABAN 5 MG/1
TABLET, FILM COATED ORAL
Qty: 60 TABLET | Refills: 0 | Status: SHIPPED | OUTPATIENT
Start: 2020-09-23 | End: 2020-10-22

## 2020-09-23 RX ORDER — GABAPENTIN 300 MG/1
CAPSULE ORAL
Qty: 90 CAPSULE | Refills: 1 | Status: SHIPPED | OUTPATIENT
Start: 2020-09-23 | End: 2020-11-30

## 2020-10-22 RX ORDER — APIXABAN 5 MG/1
TABLET, FILM COATED ORAL
Qty: 60 TABLET | Refills: 2 | Status: SHIPPED | OUTPATIENT
Start: 2020-10-22 | End: 2021-02-03

## 2020-11-29 DIAGNOSIS — G89.4 CHRONIC PAIN SYNDROME: ICD-10-CM

## 2020-11-30 RX ORDER — GABAPENTIN 300 MG/1
CAPSULE ORAL
Qty: 90 CAPSULE | Refills: 0 | Status: SHIPPED | OUTPATIENT
Start: 2020-11-30 | End: 2020-12-30

## 2020-12-01 ENCOUNTER — OFFICE VISIT (OUTPATIENT)
Dept: FAMILY MEDICINE CLINIC | Facility: CLINIC | Age: 55
End: 2020-12-01

## 2020-12-01 VITALS
DIASTOLIC BLOOD PRESSURE: 80 MMHG | TEMPERATURE: 95.8 F | HEART RATE: 78 BPM | BODY MASS INDEX: 43.58 KG/M2 | WEIGHT: 304.4 LBS | SYSTOLIC BLOOD PRESSURE: 132 MMHG | HEIGHT: 70 IN | OXYGEN SATURATION: 98 %

## 2020-12-01 DIAGNOSIS — R03.0 ELEVATED BLOOD PRESSURE READING: ICD-10-CM

## 2020-12-01 DIAGNOSIS — F41.0 PANIC DISORDER WITHOUT AGORAPHOBIA: ICD-10-CM

## 2020-12-01 DIAGNOSIS — F32.89 ATYPICAL DEPRESSIVE DISORDER: Primary | ICD-10-CM

## 2020-12-01 DIAGNOSIS — E53.8 LOW SERUM VITAMIN B12: ICD-10-CM

## 2020-12-01 DIAGNOSIS — D68.59 HYPERCOAGULABLE STATE (HCC): ICD-10-CM

## 2020-12-01 DIAGNOSIS — J02.9 ACUTE PHARYNGITIS, UNSPECIFIED ETIOLOGY: ICD-10-CM

## 2020-12-01 PROCEDURE — 99214 OFFICE O/P EST MOD 30 MIN: CPT | Performed by: FAMILY MEDICINE

## 2020-12-01 RX ORDER — AMOXICILLIN 500 MG/1
500 CAPSULE ORAL 3 TIMES DAILY
Qty: 30 CAPSULE | Refills: 0 | Status: SHIPPED | OUTPATIENT
Start: 2020-12-01 | End: 2021-03-01

## 2020-12-01 NOTE — PROGRESS NOTES
Subjective   Rashid Maldonado is a 55 y.o. male.     Chief Complaint   Patient presents with   • Sore Throat     Comes and goes   • Anxiety        Patient presents for follow-up on his anxiety and mood disorder.  He has taken a couple of Xanax recently for panic attacks.  He does not know if stress is with getting to him.  He has been looking into seeing a psychologist and we discussed this at length.    He denies any suicidal or homicidal ideation.  His chronic pain is still helped by his gabapentin.  He has been going to the chiropractor again.  As far as his sore throat, it has been going on for the past week.  Symptoms come and go.  He has had a swollen lymph node.  He denies any fever chills or ill contacts.  He denies any cough or fever.         The following portions of the patient's history were reviewed and updated as appropriate: allergies, current medications, past family history, past medical history, past social history, past surgical history and problem list.    Past Medical History:   Diagnosis Date   • Abdominal pain, LLQ    • Abnormal findings on diagnostic imaging of abdomen    • Acute deep vein thrombosis of lower extremity (CMS/HCC)    • Acute sinusitis    • Anxiety and depression    • Attention deficit disorder    • Atypical depressive disorder    • Bloating    • BMI 40.0-44.9, adult (CMS/HCC)    • Chest pain    • Chronic pain syndrome    • Colitis    • COPD (chronic obstructive pulmonary disease) (CMS/HCC)    • Cramps, muscle, general    • Dehydration    • Depression    • Diarrhea    • Difficulty breathing    • Disc degeneration, lumbosacral    • DVT (deep venous thrombosis) (CMS/HCC)     left leg   • DVT, lower extremity, recurrent (CMS/HCC)    • Edema    • Fatigue    • Fever    • GERD (gastroesophageal reflux disease)    • Heartburn    • Hyperlipidemia    • Idiopathic peripheral neuropathy    • Idiopathic urticaria    • Inflamed skin tag    • Insomnia    • Joint pain    • Long-term use of  high-risk medication    • Low back pain    • Lumbago    • Lumbosacral neuritis    • Lymphadenitis    • Melanocytic nevus    • Nausea    • Neoplasm of uncertain behavior of skin    • Neuralgia    • Neuritis    • Organic impotence    • Osteoarthritis, multiple sites    • Panic disorder without agoraphobia    • Prothrombin mutation (CMS/HCC)    • Pulmonary embolism (CMS/HCC)     bilateral lungs   • Shingles    • Shortness of breath        Past Surgical History:   Procedure Laterality Date   • BACK SURGERY N/A     done at [facility] in [place], [place]   • COLONOSCOPY N/A 2017    Procedure: COLONOSCOPY to Cecum;  Surgeon: Denton Matthews MD;  Location: [facility] ENDOSCOPY;  Service:    • ENDOSCOPY N/A 2017    Procedure: ESOPHAGOGASTRODUODENOSCOPY with Bx's;  Surgeon: Denton Matthews MD;  Location: [facility] ENDOSCOPY;  Service:    • KNEE SURGERY Left     Dr. Gloria   • KNEE SURGERY Right    • OTHER SURGICAL HISTORY      post spinal diskect osteophytect lumb interspace microdiscec       Family History   Problem Relation Age of Onset   • Hypertension Mother    • Lung cancer Mother    • Anxiety disorder Mother    • Depression Mother    • Heart disease Mother    • Leukemia Paternal Uncle    • Diabetes Paternal Grandmother    • Prostate cancer Paternal Grandfather    • Hypothyroidism Father    • Prostate cancer Maternal Grandfather        Social History     Socioeconomic History   • Marital status:      Spouse name: Yasmin   • Number of children: Not on file   • Years of education: Not on file   • Highest education level: Not on file   Occupational History     Employer: SELF-EMPLOYED   Tobacco Use   • Smoking status: Former Smoker     Years: 17.00     Types: Cigarettes     Quit date: 2000     Years since quittin.9   • Smokeless tobacco: Never Used   Substance and Sexual Activity   • Alcohol use: Yes     Comment: Occasionally   • Drug use: No   • Sexual activity: Yes         Current Outpatient  Medications:   •  ALPRAZolam (XANAX) 0.25 MG tablet, Take 1 tablet by mouth Every 12 (Twelve) Hours As Needed for Anxiety., Disp: 30 tablet, Rfl: 0  •  aspirin 81 MG chewable tablet, Chew 81 mg Daily., Disp: , Rfl:   •  Eliquis 5 MG tablet tablet, TAKE ONE TABLET BY MOUTH EVERY 12 HOURS, Disp: 60 tablet, Rfl: 2  •  FIBER PO, Take 1 capsule by mouth As Needed., Disp: , Rfl:   •  fluticasone (FLONASE) 50 MCG/ACT nasal spray, 1 spray into the nostril(s) as directed by provider Daily. (Patient taking differently: 1 spray into the nostril(s) as directed by provider As Needed.), Disp: 18.2 mL, Rfl: 2  •  gabapentin (NEURONTIN) 300 MG capsule, TAKE ONE CAPSULE BY MOUTH THREE TIMES A DAY, Disp: 90 capsule, Rfl: 0  •  hydrocortisone (ANUSOL-HC) 2.5 % rectal cream, Apply rectally 2 times daily, Disp: 30 g, Rfl: 1  •  Omega-3 Fatty Acids (OMEGA 3 PO), Take 1 capsule by mouth Daily., Disp: , Rfl:   •  Zinc Acetate, Oral, (ZINC ACETATE PO), Take  by mouth., Disp: , Rfl:   •  amoxicillin (AMOXIL) 500 MG capsule, Take 1 capsule by mouth 3 (Three) Times a Day., Disp: 30 capsule, Rfl: 0  •  ketorolac (ACULAR) 0.5 % ophthalmic solution, Administer 1 drop to the right eye 4 (Four) Times a Day., Disp: 10 mL, Rfl: 0    Review of Systems   Constitutional: Negative for chills, fatigue and fever.   HENT: Positive for sore throat. Negative for congestion and rhinorrhea.    Respiratory: Negative for cough and shortness of breath.    Cardiovascular: Negative for chest pain and leg swelling.   Gastrointestinal: Negative for abdominal pain.   Endocrine: Negative for polydipsia and polyuria.   Genitourinary: Negative for dysuria.   Musculoskeletal: Positive for back pain. Negative for arthralgias and myalgias.   Skin: Negative for rash.   Neurological: Negative for dizziness.   Hematological: Does not bruise/bleed easily.   Psychiatric/Behavioral: Negative for confusion, decreased concentration, dysphoric mood, sleep disturbance and suicidal  "ideas. The patient is nervous/anxious.        Objective   Vitals:    12/01/20 1112   BP: 132/80   Pulse: 78   Temp: 95.8 °F (35.4 °C)   SpO2: 98%   Weight: (!) 138 kg (304 lb 6.4 oz)   Height: 177.8 cm (70\")     Body mass index is 43.68 kg/m².  Physical Exam  Vitals signs and nursing note reviewed.   Constitutional:       General: He is not in acute distress.     Appearance: Normal appearance. He is well-developed.   HENT:      Head: Normocephalic and atraumatic.      Right Ear: Tympanic membrane, ear canal and external ear normal.      Left Ear: Tympanic membrane, ear canal and external ear normal.      Nose: Nose normal.      Mouth/Throat:      Mouth: Mucous membranes are moist.      Pharynx: Oropharynx is clear. Posterior oropharyngeal erythema present.      Comments: Minimal erythema the posterior pharynx  Eyes:      Conjunctiva/sclera: Conjunctivae normal.      Pupils: Pupils are equal, round, and reactive to light.   Neck:      Musculoskeletal: Neck supple.      Thyroid: No thyromegaly.   Cardiovascular:      Rate and Rhythm: Normal rate and regular rhythm.      Heart sounds: No murmur.   Pulmonary:      Effort: Pulmonary effort is normal.      Breath sounds: Normal breath sounds.   Musculoskeletal: Normal range of motion.         General: No swelling or tenderness.   Lymphadenopathy:      Cervical: Cervical adenopathy present.   Skin:     General: Skin is warm and dry.      Capillary Refill: Capillary refill takes less than 2 seconds.   Neurological:      Mental Status: He is alert and oriented to person, place, and time.   Psychiatric:         Mood and Affect: Mood normal.         Behavior: Behavior normal.           Assessment/Plan   Diagnoses and all orders for this visit:    1. Atypical depressive disorder (Primary)    2. Panic disorder without agoraphobia    3. Elevated blood pressure reading    4. Hypercoagulable state (CMS/[facility])    5. Acute pharyngitis, unspecified etiology  -     amoxicillin (AMOXIL) " 500 MG capsule; Take 1 capsule by mouth 3 (Three) Times a Day.  Dispense: 30 capsule; Refill: 0  -     COVID-19,[facility] ROUTINE, NP/OP SWAB IN TRANSPORT MEDIA OR ESWAB 72 HR TAT - Swab, Oropharynx; Future    6. Low serum vitamin B12  -     Vitamin B12  -     CBC & Differential               Patient Instructions   Would to look into mental health consult.

## 2020-12-02 LAB
BASOPHILS # BLD AUTO: 0 X10E3/UL (ref 0–0.2)
BASOPHILS NFR BLD AUTO: 1 %
EOSINOPHIL # BLD AUTO: 0.2 X10E3/UL (ref 0–0.4)
EOSINOPHIL NFR BLD AUTO: 3 %
ERYTHROCYTE [DISTWIDTH] IN BLOOD BY AUTOMATED COUNT: 12.2 % (ref 11.6–15.4)
HCT VFR BLD AUTO: 46.1 % (ref 37.5–51)
HGB BLD-MCNC: 15.7 G/DL (ref 13–17.7)
IMM GRANULOCYTES # BLD AUTO: 0 X10E3/UL (ref 0–0.1)
IMM GRANULOCYTES NFR BLD AUTO: 0 %
LYMPHOCYTES # BLD AUTO: 1.8 X10E3/UL (ref 0.7–3.1)
LYMPHOCYTES NFR BLD AUTO: 30 %
MCH RBC QN AUTO: 30.8 PG (ref 26.6–33)
MCHC RBC AUTO-ENTMCNC: 34.1 G/DL (ref 31.5–35.7)
MCV RBC AUTO: 90 FL (ref 79–97)
MONOCYTES # BLD AUTO: 0.5 X10E3/UL (ref 0.1–0.9)
MONOCYTES NFR BLD AUTO: 9 %
NEUTROPHILS # BLD AUTO: 3.4 X10E3/UL (ref 1.4–7)
NEUTROPHILS NFR BLD AUTO: 57 %
PLATELET # BLD AUTO: 267 X10E3/UL (ref 150–450)
RBC # BLD AUTO: 5.1 X10E6/UL (ref 4.14–5.8)
VIT B12 SERPL-MCNC: 316 PG/ML (ref 232–1245)
WBC # BLD AUTO: 6 X10E3/UL (ref 3.4–10.8)

## 2020-12-30 DIAGNOSIS — G89.4 CHRONIC PAIN SYNDROME: ICD-10-CM

## 2020-12-30 RX ORDER — GABAPENTIN 300 MG/1
CAPSULE ORAL
Qty: 90 CAPSULE | Refills: 0 | Status: SHIPPED | OUTPATIENT
Start: 2020-12-30 | End: 2021-02-03

## 2020-12-30 NOTE — TELEPHONE ENCOUNTER
Pt requesting refill on gabapentin  LOV 12/01/2020  UDS 08/25/2020  Last fill 11/30/2020  Agreement 08/25/2020  Med pend

## 2021-02-02 DIAGNOSIS — G89.4 CHRONIC PAIN SYNDROME: ICD-10-CM

## 2021-02-03 RX ORDER — GABAPENTIN 300 MG/1
CAPSULE ORAL
Qty: 90 CAPSULE | Refills: 0 | Status: SHIPPED | OUTPATIENT
Start: 2021-02-03 | End: 2021-03-08

## 2021-02-03 RX ORDER — APIXABAN 5 MG/1
TABLET, FILM COATED ORAL
Qty: 60 TABLET | Refills: 1 | Status: SHIPPED | OUTPATIENT
Start: 2021-02-03 | End: 2021-04-10

## 2021-03-01 ENCOUNTER — OFFICE VISIT (OUTPATIENT)
Dept: FAMILY MEDICINE CLINIC | Facility: CLINIC | Age: 56
End: 2021-03-01

## 2021-03-01 VITALS
OXYGEN SATURATION: 96 % | DIASTOLIC BLOOD PRESSURE: 72 MMHG | SYSTOLIC BLOOD PRESSURE: 126 MMHG | BODY MASS INDEX: 44.24 KG/M2 | HEIGHT: 70 IN | HEART RATE: 73 BPM | WEIGHT: 309 LBS | TEMPERATURE: 97.7 F

## 2021-03-01 DIAGNOSIS — Z79.899 HIGH RISK MEDICATION USE: ICD-10-CM

## 2021-03-01 DIAGNOSIS — J30.1 SEASONAL ALLERGIC RHINITIS DUE TO POLLEN: ICD-10-CM

## 2021-03-01 DIAGNOSIS — R53.83 OTHER FATIGUE: ICD-10-CM

## 2021-03-01 DIAGNOSIS — F32.89 ATYPICAL DEPRESSIVE DISORDER: ICD-10-CM

## 2021-03-01 DIAGNOSIS — E53.8 LOW SERUM VITAMIN B12: ICD-10-CM

## 2021-03-01 DIAGNOSIS — I82.409 RECURRENT DEEP VEIN THROMBOSIS (DVT) OF LOWER EXTREMITY, UNSPECIFIED LATERALITY (HCC): ICD-10-CM

## 2021-03-01 DIAGNOSIS — G89.4 CHRONIC PAIN SYNDROME: Primary | ICD-10-CM

## 2021-03-01 PROCEDURE — 99214 OFFICE O/P EST MOD 30 MIN: CPT | Performed by: FAMILY MEDICINE

## 2021-03-01 PROCEDURE — 96372 THER/PROPH/DIAG INJ SC/IM: CPT | Performed by: FAMILY MEDICINE

## 2021-03-01 RX ORDER — CYANOCOBALAMIN 1000 UG/ML
1000 INJECTION, SOLUTION INTRAMUSCULAR; SUBCUTANEOUS ONCE
Status: COMPLETED | OUTPATIENT
Start: 2021-03-01 | End: 2021-03-01

## 2021-03-01 RX ORDER — MONTELUKAST SODIUM 10 MG/1
10 TABLET ORAL NIGHTLY
Qty: 30 TABLET | Refills: 1 | Status: SHIPPED | OUTPATIENT
Start: 2021-03-01

## 2021-03-01 RX ADMIN — CYANOCOBALAMIN 1000 MCG: 1000 INJECTION, SOLUTION INTRAMUSCULAR; SUBCUTANEOUS at 13:54

## 2021-03-01 NOTE — PATIENT INSTRUCTIONS
Let me know if the dentist does not think your tooth is the issue.  Definitely stay on antihistamines and steroid nasal spray for allergies.  I will add in montelukast to see if that helps.

## 2021-03-01 NOTE — PROGRESS NOTES
"Chief Complaint  Hyperlipidemia, Sinusitis, and Dental Pain    Subjective          Rashid Maldonado presents to [facility]  Has been feeling bad.  Has a root canal in the past year and now that side of his face has been throbbing every night for the past couple weeks.  Had 3 days of prednisone from previous and it really helped.  Due for his routine checkup on his mood disorder and chronic pain.  His gabapentin and alprazolam still help.  He knows he needs to go and see a psychiatrist but has not set that up.  As far as his facial pain is also concerned about his sinuses.  He is taking his antihistamine and nasal steroid.           Objective   Vital Signs:   /72   Pulse 73   Temp 97.7 °F (36.5 °C)   Ht 177.8 cm (70\")   Wt (!) 140 kg (309 lb)   SpO2 96%   BMI 44.34 kg/m²     Physical Exam  Vitals signs and nursing note reviewed.   Constitutional:       General: He is not in acute distress.     Appearance: Normal appearance. He is well-developed. He is obese.   HENT:      Head: Normocephalic and atraumatic.      Right Ear: Tympanic membrane, ear canal and external ear normal.      Left Ear: Tympanic membrane, ear canal and external ear normal.      Nose: Rhinorrhea present. No congestion.      Comments: Nares boggy with clear rhinorrhea     Mouth/Throat:      Mouth: Mucous membranes are moist.      Pharynx: Oropharynx is clear.      Comments: Inflammation noted at first molar on the right upper jaw  Eyes:      Conjunctiva/sclera: Conjunctivae normal.      Pupils: Pupils are equal, round, and reactive to light.   Neck:      Musculoskeletal: Neck supple.      Thyroid: No thyromegaly.   Cardiovascular:      Rate and Rhythm: Normal rate and regular rhythm.      Heart sounds: No murmur.   Pulmonary:      Effort: Pulmonary effort is normal.      Breath sounds: Normal breath sounds.   Musculoskeletal: Normal range of motion.         General: No swelling or tenderness.      Comments: " Bilateral lower lumbar paraspinous tenderness   Skin:     General: Skin is warm and dry.      Capillary Refill: Capillary refill takes less than 2 seconds.   Neurological:      Mental Status: He is alert and oriented to person, place, and time.   Psychiatric:         Mood and Affect: Mood normal.         Behavior: Behavior normal.        Result Review :                 Assessment and Plan    Diagnoses and all orders for this visit:    1. Chronic pain syndrome (Primary)    2. Atypical depressive disorder    3. Low serum vitamin B12  -     Vitamin B12  -     cyanocobalamin injection 1,000 mcg    4. Recurrent deep vein thrombosis (DVT) of lower extremity, unspecified laterality (CMS/HCC)    5. High risk medication use  -     Drug Profile 785217 - Urine, Clean Catch    6. Other fatigue  -     TSH  -     CBC & Differential  -     Comprehensive Metabolic Panel    7. Seasonal allergic rhinitis due to pollen  -     montelukast (Singulair) 10 MG tablet; Take 1 tablet by mouth Every Night.  Dispense: 30 tablet; Refill: 1        Follow Up   Return in about 3 months (around 6/1/2021) for Recheck.  Patient was given instructions and counseling regarding his condition or for health maintenance advice. Please see specific information pulled into the AVS if appropriate.

## 2021-03-06 LAB
ALBUMIN SERPL-MCNC: 4 G/DL (ref 3.8–4.9)
ALBUMIN/GLOB SERPL: 1.6 {RATIO} (ref 1.2–2.2)
ALP SERPL-CCNC: 82 IU/L (ref 39–117)
ALT SERPL-CCNC: 22 IU/L (ref 0–44)
AMPHETAMINES UR QL SCN: NEGATIVE NG/ML
AST SERPL-CCNC: 24 IU/L (ref 0–40)
BARBITURATES UR QL: NEGATIVE NG/ML
BASOPHILS # BLD AUTO: 0 X10E3/UL (ref 0–0.2)
BASOPHILS NFR BLD AUTO: 1 %
BENZODIAZ UR QL SCN: NEGATIVE NG/ML
BILIRUB SERPL-MCNC: 0.3 MG/DL (ref 0–1.2)
BUN SERPL-MCNC: 16 MG/DL (ref 6–24)
BUN/CREAT SERPL: 18 (ref 9–20)
BZE UR QL: NEGATIVE NG/ML
CALCIUM SERPL-MCNC: 9.3 MG/DL (ref 8.7–10.2)
CANNABINOIDS UR QL CFM: NEGATIVE
CHLORIDE SERPL-SCNC: 106 MMOL/L (ref 96–106)
CO2 SERPL-SCNC: 23 MMOL/L (ref 20–29)
CREAT SERPL-MCNC: 0.89 MG/DL (ref 0.76–1.27)
CREAT UR-MCNC: 66.9 MG/DL (ref 20–300)
EOSINOPHIL # BLD AUTO: 0.2 X10E3/UL (ref 0–0.4)
EOSINOPHIL NFR BLD AUTO: 3 %
ERYTHROCYTE [DISTWIDTH] IN BLOOD BY AUTOMATED COUNT: 12.6 % (ref 11.6–15.4)
ETHANOL UR-MCNC: NEGATIVE %
GLOBULIN SER CALC-MCNC: 2.5 G/DL (ref 1.5–4.5)
GLUCOSE SERPL-MCNC: 124 MG/DL (ref 65–99)
HCT VFR BLD AUTO: 45.8 % (ref 37.5–51)
HGB BLD-MCNC: 15.4 G/DL (ref 13–17.7)
IMM GRANULOCYTES # BLD AUTO: 0 X10E3/UL (ref 0–0.1)
IMM GRANULOCYTES NFR BLD AUTO: 0 %
LYMPHOCYTES # BLD AUTO: 2.3 X10E3/UL (ref 0.7–3.1)
LYMPHOCYTES NFR BLD AUTO: 35 %
MCH RBC QN AUTO: 30.6 PG (ref 26.6–33)
MCHC RBC AUTO-ENTMCNC: 33.6 G/DL (ref 31.5–35.7)
MCV RBC AUTO: 91 FL (ref 79–97)
MEPERIDINE UR QL: NEGATIVE NG/ML
METHADONE UR QL: NEGATIVE NG/ML
MONOCYTES # BLD AUTO: 0.5 X10E3/UL (ref 0.1–0.9)
MONOCYTES NFR BLD AUTO: 7 %
NEUTROPHILS # BLD AUTO: 3.6 X10E3/UL (ref 1.4–7)
NEUTROPHILS NFR BLD AUTO: 54 %
OPIATES UR QL SCN: NEGATIVE NG/ML
OXYCODONE+OXYMORPHONE UR QL SCN: NEGATIVE NG/ML
PCP UR QL: NEGATIVE NG/ML
PLATELET # BLD AUTO: 265 X10E3/UL (ref 150–450)
POTASSIUM SERPL-SCNC: 4.4 MMOL/L (ref 3.5–5.2)
PROPOXYPH UR QL: NEGATIVE NG/ML
PROT SERPL-MCNC: 6.5 G/DL (ref 6–8.5)
RBC # BLD AUTO: 5.04 X10E6/UL (ref 4.14–5.8)
SODIUM SERPL-SCNC: 141 MMOL/L (ref 134–144)
TRAMADOL UR QL SCN: NEGATIVE NG/ML
TSH SERPL DL<=0.005 MIU/L-ACNC: 1.62 UIU/ML (ref 0.45–4.5)
VIT B12 SERPL-MCNC: >2000 PG/ML (ref 232–1245)
WBC # BLD AUTO: 6.6 X10E3/UL (ref 3.4–10.8)

## 2021-03-07 DIAGNOSIS — G89.4 CHRONIC PAIN SYNDROME: ICD-10-CM

## 2021-03-08 RX ORDER — GABAPENTIN 300 MG/1
CAPSULE ORAL
Qty: 90 CAPSULE | Refills: 0 | Status: SHIPPED | OUTPATIENT
Start: 2021-03-08 | End: 2021-04-10

## 2021-03-08 NOTE — TELEPHONE ENCOUNTER
Requesting refill on gabapentin  LOV 03/01/2021  UDS 03/01/2021  Last jennifer 02/03/2021  Contract 08/25/2020  Med pend

## 2021-03-09 LAB
SPECIMEN STATUS: NORMAL
WRITTEN AUTHORIZATION: NORMAL

## 2021-03-10 ENCOUNTER — TELEPHONE (OUTPATIENT)
Dept: FAMILY MEDICINE CLINIC | Facility: CLINIC | Age: 56
End: 2021-03-10

## 2021-03-10 NOTE — TELEPHONE ENCOUNTER
PT IS NEEDING TO HAVE A TOOTH EXTRACTED BY DR. VASQUEZ.  THEY TOLD HIM BECAUSE HE TAKES ELIQUIS TO CALL US TO FIND OUT HOW MANY DAYS HE NEEDS TO STOP TAKING IT BEFORE THEY CAN REMOVE HIS TOOTH.  HE'S IN A LOT OF PAIN AND WOULD LIKE TO GET THIS DONE ASAP.  AS SOON AS WE CALL HIM BACK WITH INFO HE CAN CALL TO SCHEDULE HIS APPT.  THANKS  [phone]

## 2021-03-26 ENCOUNTER — BULK ORDERING (OUTPATIENT)
Dept: CASE MANAGEMENT | Facility: OTHER | Age: 56
End: 2021-03-26

## 2021-03-26 DIAGNOSIS — Z23 IMMUNIZATION DUE: ICD-10-CM

## 2021-04-09 DIAGNOSIS — G89.4 CHRONIC PAIN SYNDROME: ICD-10-CM

## 2021-04-10 RX ORDER — APIXABAN 5 MG/1
TABLET, FILM COATED ORAL
Qty: 60 TABLET | Refills: 0 | Status: SHIPPED | OUTPATIENT
Start: 2021-04-10 | End: 2021-05-10

## 2021-04-10 RX ORDER — GABAPENTIN 300 MG/1
CAPSULE ORAL
Qty: 90 CAPSULE | Refills: 0 | Status: SHIPPED | OUTPATIENT
Start: 2021-04-10 | End: 2021-05-10

## 2021-05-09 DIAGNOSIS — G89.4 CHRONIC PAIN SYNDROME: ICD-10-CM

## 2021-05-10 RX ORDER — APIXABAN 5 MG/1
TABLET, FILM COATED ORAL
Qty: 60 TABLET | Refills: 0 | Status: SHIPPED | OUTPATIENT
Start: 2021-05-10 | End: 2021-06-08

## 2021-05-10 RX ORDER — GABAPENTIN 300 MG/1
CAPSULE ORAL
Qty: 90 CAPSULE | Refills: 0 | Status: SHIPPED | OUTPATIENT
Start: 2021-05-10 | End: 2021-06-08

## 2021-06-07 DIAGNOSIS — G89.4 CHRONIC PAIN SYNDROME: ICD-10-CM

## 2021-06-08 RX ORDER — APIXABAN 5 MG/1
TABLET, FILM COATED ORAL
Qty: 60 TABLET | Refills: 0 | Status: SHIPPED | OUTPATIENT
Start: 2021-06-08 | End: 2021-07-08

## 2021-06-08 RX ORDER — GABAPENTIN 300 MG/1
CAPSULE ORAL
Qty: 90 CAPSULE | Refills: 0 | Status: SHIPPED | OUTPATIENT
Start: 2021-06-08 | End: 2021-07-08

## 2021-07-07 DIAGNOSIS — G89.4 CHRONIC PAIN SYNDROME: ICD-10-CM

## 2021-07-08 RX ORDER — APIXABAN 5 MG/1
TABLET, FILM COATED ORAL
Qty: 60 TABLET | Refills: 0 | Status: SHIPPED | OUTPATIENT
Start: 2021-07-08 | End: 2021-08-09

## 2021-07-08 RX ORDER — GABAPENTIN 300 MG/1
CAPSULE ORAL
Qty: 90 CAPSULE | Refills: 0 | Status: SHIPPED | OUTPATIENT
Start: 2021-07-08 | End: 2021-08-09

## 2021-08-08 DIAGNOSIS — G89.4 CHRONIC PAIN SYNDROME: ICD-10-CM

## 2021-08-09 RX ORDER — GABAPENTIN 300 MG/1
CAPSULE ORAL
Qty: 90 CAPSULE | Refills: 0 | Status: SHIPPED | OUTPATIENT
Start: 2021-08-09 | End: 2021-09-13

## 2021-08-09 RX ORDER — APIXABAN 5 MG/1
TABLET, FILM COATED ORAL
Qty: 60 TABLET | Refills: 0 | Status: SHIPPED | OUTPATIENT
Start: 2021-08-09 | End: 2021-09-13

## 2021-08-12 ENCOUNTER — OFFICE VISIT (OUTPATIENT)
Dept: FAMILY MEDICINE CLINIC | Facility: CLINIC | Age: 56
End: 2021-08-12

## 2021-08-12 VITALS
TEMPERATURE: 98.4 F | HEIGHT: 70 IN | DIASTOLIC BLOOD PRESSURE: 84 MMHG | OXYGEN SATURATION: 99 % | SYSTOLIC BLOOD PRESSURE: 162 MMHG | BODY MASS INDEX: 43.2 KG/M2 | WEIGHT: 301.8 LBS | HEART RATE: 78 BPM

## 2021-08-12 DIAGNOSIS — F32.89 ATYPICAL DEPRESSIVE DISORDER: ICD-10-CM

## 2021-08-12 DIAGNOSIS — Z79.899 HIGH RISK MEDICATION USE: ICD-10-CM

## 2021-08-12 DIAGNOSIS — E53.8 LOW SERUM VITAMIN B12: ICD-10-CM

## 2021-08-12 DIAGNOSIS — R73.03 PREDIABETES: Primary | ICD-10-CM

## 2021-08-12 DIAGNOSIS — I82.409 RECURRENT DEEP VEIN THROMBOSIS (DVT) OF LOWER EXTREMITY, UNSPECIFIED LATERALITY (HCC): ICD-10-CM

## 2021-08-12 DIAGNOSIS — G89.4 CHRONIC PAIN SYNDROME: ICD-10-CM

## 2021-08-12 DIAGNOSIS — R53.83 OTHER FATIGUE: ICD-10-CM

## 2021-08-12 LAB — HBA1C MFR BLD: 6.5 %

## 2021-08-12 PROCEDURE — 83036 HEMOGLOBIN GLYCOSYLATED A1C: CPT | Performed by: FAMILY MEDICINE

## 2021-08-12 PROCEDURE — 99214 OFFICE O/P EST MOD 30 MIN: CPT | Performed by: FAMILY MEDICINE

## 2021-08-12 NOTE — PROGRESS NOTES
"Chief Complaint  Diabetes (wants a b12 shot, was seen at  8/10/2021 for pansinusitis) and Sinusitis    Subjective          Rashid Maldonado presents to [facility]  Presents for follow-up on his prediabetes, mood disorder, chronic pain syndrome and fatigue.  He has been more tired lately.  He did get over sinusitis recently had gone to urgent care.  He denies any fever or chills.  He is feeling much better.  He still requires gabapentin for his chronic pain.  His mood disorders are under relatively good control.  He did have a bad funk several months ago but it is better.      Objective   Vital Signs:   /84   Pulse 78   Temp 98.4 °F (36.9 °C)   Ht 177.8 cm (70\")   Wt (!) 137 kg (301 lb 12.8 oz)   SpO2 99%   BMI 43.30 kg/m²     Physical Exam  Constitutional:       General: He is not in acute distress.     Appearance: He is well-developed. He is obese.   HENT:      Head: Normocephalic and atraumatic.      Right Ear: External ear normal.      Left Ear: External ear normal.      Mouth/Throat:      Mouth: Mucous membranes are moist.      Pharynx: Oropharynx is clear.   Eyes:      Conjunctiva/sclera: Conjunctivae normal.      Pupils: Pupils are equal, round, and reactive to light.   Cardiovascular:      Rate and Rhythm: Normal rate and regular rhythm.      Heart sounds: No murmur heard.     Pulmonary:      Effort: Pulmonary effort is normal. No respiratory distress.   Musculoskeletal:         General: Tenderness present.      Cervical back: Neck supple.      Comments: Mild diffuse paraspinous lower lumbar muscle spasm and tenderness   Lymphadenopathy:      Cervical: Cervical adenopathy present.   Skin:     General: Skin is warm and dry.   Neurological:      Mental Status: He is alert and oriented to person, place, and time.   Psychiatric:         Mood and Affect: Mood normal.         Behavior: Behavior normal.        Result Review :   The following data was reviewed by: Greta" Lea Kehrer, MD on 08/12/2021:  Most Recent A1C    HGBA1C Most Recent 8/12/21   Hemoglobin A1C 6.5                     Assessment and Plan    Diagnoses and all orders for this visit:    1. Prediabetes (Primary)  -     POC Glycosylated Hemoglobin (Hb A1C)  -     TSH  -     Vitamin B12  -     CBC & Differential  -     Comprehensive Metabolic Panel  -     Lipid Panel  -     Cortisol    2. Chronic pain syndrome    3. Other fatigue  -     TSH  -     Vitamin B12  -     CBC & Differential  -     Comprehensive Metabolic Panel  -     Lipid Panel  -     Cortisol    4. Atypical depressive disorder    5. High risk medication use  -     Drug Profile [id number] - Urine, Clean Catch    6. Recurrent deep vein thrombosis (DVT) of lower extremity, unspecified laterality (CMS/HCC)    7. Low serum vitamin B12  -     Vitamin B12    Other orders  -     Cancel: Drug Profile [id number] - Urine, Clean Catch      Prediabetes-worsening, patient warned that he needs to get this under control and will discuss medication next time  Chronic pain syndrome-stable, continue current medication updated contract  Fatigue-we will check labs today  Atypical depression-stable, continue current medication   recurrent DVT-stay on Eliquis  Follow Up   Return in about 3 months (around 11/12/2021) for Recheck sugar, Annual physical.  Patient was given instructions and counseling regarding his condition or for health maintenance advice. Please see specific information pulled into the AVS if appropriate.

## 2021-08-12 NOTE — PATIENT INSTRUCTIONS
"Diabetes Care, 44(Suppl 1), S34-S39. https://doi.org/https://doi.org/10.2337/me42-E399\">   Prediabetes  Prediabetes is the condition of having a blood sugar or blood glucose level that is higher than it should be but not high enough for you to be diagnosed with type 2 diabetes (type 2 diabetes mellitus). Having prediabetes puts you at risk for developing type 2 diabetes. Prediabetes may be called impaired glucose tolerance or impaired fasting glucose.  Prediabetes usually does not cause symptoms. Your health care provider can diagnose this condition with blood tests. You may be tested for prediabetes if you are overweight and if you have at least one other risk factor for prediabetes. With certain lifestyle changes, you may be able to delay or prevent type 2 diabetes.  What is blood glucose and how is it measured?  Blood glucose refers to the amount of glucose in your bloodstream. Glucose comes from eating foods that contain sugars and starches (carbohydrates). The body breaks down these carbohydrates into glucose. Your blood glucose level may be measured in milligrams per deciliter (mg/dL) or millimoles per liter (mmol/L). Your blood glucose may be checked with one or more of the following blood tests:  · A fasting blood glucose (FBG) test. You will not be allowed to eat for 8 hours or longer before a blood sample is taken.  ? A normal range for FBG is  mg/dL (3.9-5.6 mmol/L).  · An A1c blood test (hemoglobin A1c). This test provides information about blood glucose control over the previous 2?3 months.  · An oral glucose tolerance test (OGTT). This test measures your blood glucose at two points in time:  ? After fasting. This is your baseline level.  ? Two hours after you drink a beverage that contains glucose.  You may be diagnosed with prediabetes if:  · Your FBG is 100?125 mg/dL (5.6-6.9 mmol/L).  · Your A1c level is 5.7?6.4% (39-46 mmol/mol).  · Your OGTT result is 140?199 mg/dL (7.8-11 mmol/L).  These " blood tests may be repeated to confirm your diagnosis.  How can this condition affect me?  The pancreas makes a hormone called insulin that helps to move glucose from the bloodstream into cells. Insulin resistance develops when cells in the body do not respond properly to insulin that the body makes. When this happens, excess glucose builds up in the blood instead of going into cells. As a result, high blood glucose (hyperglycemia) can develop, which can cause many complications. Hyperglycemia is a symptom of prediabetes.  Having high blood glucose for a long time is dangerous. Too much glucose in your blood can damage nerves and blood vessels. Long-term damage can lead to complications from diabetes, which may include:  · Heart disease.  · Stroke.  · Blindness.  · Kidney disease.  · Depression.  · Poor circulation in the feet and legs. In severe cases, this could lead to surgical removal of a leg (amputation).  What can increase my risk?  You are more likely to develop this condition if:  · You have a family member with type 2 diabetes.  · You are older than 45 years.  · You have gotten diabetes during a pregnancy (gestational diabetes) or have had polycystic ovary syndrome (PCOS). PCOS affects the hormones that regulate reproduction in women.  · You are overweight.  · You live a life without physical activity or exercise (sedentary lifestyle).  · You have a history of heart disease, including problems with cholesterol levels, high levels of blood fats, or high blood pressure.  What actions can I take to prevent diabetes?  Nutrition    · Follow a healthy meal plan. This includes eating lean proteins, whole grains, legumes, fresh fruits and vegetables, low-fat dairy products, and healthy fats.  · Follow instructions from your health care provider about eating or drinking restrictions.  · Make an appointment to see a dietitian who can help you create a healthy eating plan that is right for you.  Lifestyle  · Be  physically active.  ? Do moderate-intensity physical activity for 30 or more minutes on 5 or more days of the week, or as much as told by your health care provider. This could be brisk walking, biking, or water aerobics.  ? Ask your health care provider what activities are safe for you. A mix of physical activities may be best, such as walking, swimming, biking, and strength training.  · Lose weight as told by your health care provider.  ? Losing 5-7% of your body weight can reverse insulin resistance.  ? Your health care provider can determine how much weight loss is best for you. He or she can also help you lose weight safely.  · Do not drink alcohol if:  ? Your health care provider tells you not to drink.  ? You are pregnant, may be pregnant, or are planning to become pregnant.  · If you drink alcohol:  ? Limit how much you use to:  § 0-1 drink a day for women.  § 0-2 drinks a day for men.  ? Be aware of how much alcohol is in your drink. In the [place] one drink equals one 12 oz bottle of beer (355 mL), one 5 oz glass of wine (148 mL), or one 1½ oz glass of hard liquor (44 mL).  General information  · Do not use any products that contain nicotine or tobacco, such as cigarettes, e-cigarettes, and chewing tobacco. If you need help quitting, ask your health care provider.  · Take over-the-counter and prescription medicines as told by your health care provider. You may be prescribed medicines that help lower the risk of type 2 diabetes.  · Keep all follow-up visits as told by your health care provider. This is important.  Where to find more information  American Diabetes Association: www.diabetes.org  Academy of Nutrition and Dietetics: www.eatright.org  American Heart Association: www.heart.org  Summary  · Prediabetes is the condition of having a blood sugar (blood glucose) level that is higher than it should be but not high enough for you to be diagnosed with type 2 diabetes.  · Having prediabetes puts you at risk  for developing type 2 diabetes (type 2 diabetes mellitus).  · To help prevent type 2 diabetes, make lifestyle changes such as eating a healthy diet and being physically active. Lose weight as told by your health care provider.  This information is not intended to replace advice given to you by your health care provider. Make sure you discuss any questions you have with your health care provider.  Document Revised: 11/28/2020 Document Reviewed: 11/28/2020  [employer] Patient Education © 2021 [employer]      Make sure to come in if your have frequency.

## 2021-08-13 LAB
ALBUMIN SERPL-MCNC: 4.3 G/DL (ref 3.8–4.9)
ALBUMIN/GLOB SERPL: 1.5 {RATIO} (ref 1.2–2.2)
ALP SERPL-CCNC: 87 IU/L (ref 48–121)
ALT SERPL-CCNC: 21 IU/L (ref 0–44)
AST SERPL-CCNC: 20 IU/L (ref 0–40)
BASOPHILS # BLD AUTO: 0.1 X10E3/UL (ref 0–0.2)
BASOPHILS NFR BLD AUTO: 1 %
BILIRUB SERPL-MCNC: 0.6 MG/DL (ref 0–1.2)
BUN SERPL-MCNC: 14 MG/DL (ref 6–24)
BUN/CREAT SERPL: 15 (ref 9–20)
CALCIUM SERPL-MCNC: 9.5 MG/DL (ref 8.7–10.2)
CHLORIDE SERPL-SCNC: 101 MMOL/L (ref 96–106)
CHOLEST SERPL-MCNC: 231 MG/DL (ref 100–199)
CO2 SERPL-SCNC: 26 MMOL/L (ref 20–29)
CORTIS SERPL-MCNC: 8.6 UG/DL
CREAT SERPL-MCNC: 0.94 MG/DL (ref 0.76–1.27)
EOSINOPHIL # BLD AUTO: 0.2 X10E3/UL (ref 0–0.4)
EOSINOPHIL NFR BLD AUTO: 2 %
ERYTHROCYTE [DISTWIDTH] IN BLOOD BY AUTOMATED COUNT: 12.5 % (ref 11.6–15.4)
GLOBULIN SER CALC-MCNC: 2.9 G/DL (ref 1.5–4.5)
GLUCOSE SERPL-MCNC: 85 MG/DL (ref 65–99)
HCT VFR BLD AUTO: 44.5 % (ref 37.5–51)
HDLC SERPL-MCNC: 44 MG/DL
HGB BLD-MCNC: 15.3 G/DL (ref 13–17.7)
IMM GRANULOCYTES # BLD AUTO: 0 X10E3/UL (ref 0–0.1)
IMM GRANULOCYTES NFR BLD AUTO: 0 %
LDLC SERPL CALC-MCNC: 173 MG/DL (ref 0–99)
LYMPHOCYTES # BLD AUTO: 2.6 X10E3/UL (ref 0.7–3.1)
LYMPHOCYTES NFR BLD AUTO: 29 %
MCH RBC QN AUTO: 30.8 PG (ref 26.6–33)
MCHC RBC AUTO-ENTMCNC: 34.4 G/DL (ref 31.5–35.7)
MCV RBC AUTO: 90 FL (ref 79–97)
MONOCYTES # BLD AUTO: 0.7 X10E3/UL (ref 0.1–0.9)
MONOCYTES NFR BLD AUTO: 7 %
NEUTROPHILS # BLD AUTO: 5.6 X10E3/UL (ref 1.4–7)
NEUTROPHILS NFR BLD AUTO: 61 %
PLATELET # BLD AUTO: 260 X10E3/UL (ref 150–450)
POTASSIUM SERPL-SCNC: 4.5 MMOL/L (ref 3.5–5.2)
PROT SERPL-MCNC: 7.2 G/DL (ref 6–8.5)
RBC # BLD AUTO: 4.97 X10E6/UL (ref 4.14–5.8)
SODIUM SERPL-SCNC: 140 MMOL/L (ref 134–144)
TRIGL SERPL-MCNC: 80 MG/DL (ref 0–149)
TSH SERPL DL<=0.005 MIU/L-ACNC: 1.64 UIU/ML (ref 0.45–4.5)
VIT B12 SERPL-MCNC: 386 PG/ML (ref 232–1245)
VLDLC SERPL CALC-MCNC: 14 MG/DL (ref 5–40)
WBC # BLD AUTO: 9.2 X10E3/UL (ref 3.4–10.8)

## 2021-08-16 DIAGNOSIS — E78.5 HYPERLIPIDEMIA, UNSPECIFIED HYPERLIPIDEMIA TYPE: Primary | ICD-10-CM

## 2021-08-16 RX ORDER — PRAVASTATIN SODIUM 20 MG
20 TABLET ORAL NIGHTLY
Qty: 90 TABLET | Refills: 1 | Status: SHIPPED | OUTPATIENT
Start: 2021-08-16 | End: 2021-11-16 | Stop reason: SDDI

## 2021-08-19 LAB
AMPHETAMINES UR QL SCN: NEGATIVE NG/ML
BARBITURATES UR QL: NEGATIVE NG/ML
BENZODIAZ UR QL SCN: NEGATIVE NG/ML
BZE UR QL: NEGATIVE NG/ML
CANNABINOIDS UR QL CFM: NEGATIVE
CREAT UR-MCNC: 202.5 MG/DL (ref 20–300)
ETHANOL UR-MCNC: NEGATIVE %
MEPERIDINE UR QL: NEGATIVE NG/ML
METHADONE UR QL: NEGATIVE NG/ML
OPIATES UR QL SCN: NEGATIVE NG/ML
OXYCODONE+OXYMORPHONE UR QL SCN: NEGATIVE NG/ML
PCP UR QL: NEGATIVE NG/ML
PROPOXYPH UR QL: NEGATIVE NG/ML
TRAMADOL UR QL SCN: NEGATIVE NG/ML

## 2021-09-11 DIAGNOSIS — G89.4 CHRONIC PAIN SYNDROME: ICD-10-CM

## 2021-09-13 RX ORDER — APIXABAN 5 MG/1
TABLET, FILM COATED ORAL
Qty: 60 TABLET | Refills: 2 | Status: SHIPPED | OUTPATIENT
Start: 2021-09-13 | End: 2021-12-13

## 2021-09-13 RX ORDER — GABAPENTIN 300 MG/1
CAPSULE ORAL
Qty: 90 CAPSULE | Refills: 0 | Status: SHIPPED | OUTPATIENT
Start: 2021-09-13 | End: 2021-10-06

## 2021-10-05 DIAGNOSIS — G89.4 CHRONIC PAIN SYNDROME: ICD-10-CM

## 2021-10-06 RX ORDER — GABAPENTIN 300 MG/1
CAPSULE ORAL
Qty: 90 CAPSULE | Refills: 0 | Status: SHIPPED | OUTPATIENT
Start: 2021-10-06 | End: 2021-11-11

## 2021-11-11 DIAGNOSIS — G89.4 CHRONIC PAIN SYNDROME: ICD-10-CM

## 2021-11-11 RX ORDER — GABAPENTIN 300 MG/1
CAPSULE ORAL
Qty: 90 CAPSULE | Refills: 0 | Status: SHIPPED | OUTPATIENT
Start: 2021-11-11 | End: 2021-11-16 | Stop reason: SDUPTHER

## 2021-11-16 ENCOUNTER — OFFICE VISIT (OUTPATIENT)
Dept: FAMILY MEDICINE CLINIC | Facility: CLINIC | Age: 56
End: 2021-11-16

## 2021-11-16 VITALS
HEART RATE: 87 BPM | TEMPERATURE: 97.5 F | WEIGHT: 278.8 LBS | BODY MASS INDEX: 39.91 KG/M2 | OXYGEN SATURATION: 99 % | HEIGHT: 70 IN | DIASTOLIC BLOOD PRESSURE: 86 MMHG | SYSTOLIC BLOOD PRESSURE: 154 MMHG

## 2021-11-16 DIAGNOSIS — J01.00 ACUTE NON-RECURRENT MAXILLARY SINUSITIS: ICD-10-CM

## 2021-11-16 DIAGNOSIS — R03.0 ELEVATED BLOOD PRESSURE READING: ICD-10-CM

## 2021-11-16 DIAGNOSIS — I82.409 RECURRENT DEEP VEIN THROMBOSIS (DVT) OF LOWER EXTREMITY, UNSPECIFIED LATERALITY (HCC): ICD-10-CM

## 2021-11-16 DIAGNOSIS — R73.03 PREDIABETES: ICD-10-CM

## 2021-11-16 DIAGNOSIS — G89.4 CHRONIC PAIN SYNDROME: ICD-10-CM

## 2021-11-16 DIAGNOSIS — E53.8 LOW SERUM VITAMIN B12: ICD-10-CM

## 2021-11-16 DIAGNOSIS — E78.5 HYPERLIPIDEMIA, UNSPECIFIED HYPERLIPIDEMIA TYPE: ICD-10-CM

## 2021-11-16 DIAGNOSIS — Z00.00 ROUTINE HEALTH MAINTENANCE: Primary | ICD-10-CM

## 2021-11-16 DIAGNOSIS — Z79.899 HIGH RISK MEDICATION USE: ICD-10-CM

## 2021-11-16 DIAGNOSIS — Z12.5 SCREENING PSA (PROSTATE SPECIFIC ANTIGEN): ICD-10-CM

## 2021-11-16 DIAGNOSIS — F32.89 ATYPICAL DEPRESSIVE DISORDER: ICD-10-CM

## 2021-11-16 PROCEDURE — 96372 THER/PROPH/DIAG INJ SC/IM: CPT | Performed by: FAMILY MEDICINE

## 2021-11-16 PROCEDURE — 99396 PREV VISIT EST AGE 40-64: CPT | Performed by: FAMILY MEDICINE

## 2021-11-16 PROCEDURE — 99214 OFFICE O/P EST MOD 30 MIN: CPT | Performed by: FAMILY MEDICINE

## 2021-11-16 RX ORDER — AMOXICILLIN 500 MG/1
1000 CAPSULE ORAL 2 TIMES DAILY
Qty: 28 CAPSULE | Refills: 0 | Status: SHIPPED | OUTPATIENT
Start: 2021-11-16 | End: 2021-11-23

## 2021-11-16 RX ORDER — GABAPENTIN 300 MG/1
300 CAPSULE ORAL 3 TIMES DAILY
Qty: 90 CAPSULE | Refills: 2 | Status: SHIPPED | OUTPATIENT
Start: 2021-11-16 | End: 2021-12-13

## 2021-11-16 RX ORDER — CYANOCOBALAMIN 1000 UG/ML
1000 INJECTION, SOLUTION INTRAMUSCULAR; SUBCUTANEOUS ONCE
Status: COMPLETED | OUTPATIENT
Start: 2021-11-16 | End: 2021-11-16

## 2021-11-16 RX ADMIN — CYANOCOBALAMIN 1000 MCG: 1000 INJECTION, SOLUTION INTRAMUSCULAR; SUBCUTANEOUS at 13:50

## 2021-11-16 NOTE — PROGRESS NOTES
Subjective   Rashid Maldonado is a 56 y.o. male who presents for annual male wellness exam.  Chief Complaint   Patient presents with   • Annual Exam     Here for his annual.  Did not take the cholesterol medication.  Has been fasting this afternoon.  Has lost almost 30 pounds.   Has been taking some vitamin drinks.  Still he is on the gabapentin for his mood and pain.  Is doing well.  He still has some alprazolam for panic attacks from over a year ago.  He has trouble with sinus pain and congestion for the past few weeks and is not getting better.  Has been taking all his allergy medication.    Sexual History: yes   Contraception: na  Diet: starting to eat healthier  Exercise: some  Do you feel safe? yes  Have you ever been abused? no  Last dental exam: up to date  Eye exam: due    Colon Cancer Screening: up to date  PSA: will do      Immunization History   Administered Date(s) Administered   • Pneumococcal Polysaccharide (PPSV23) 08/25/2020   • Tdap 05/21/2020       The following portions of the patient's history were reviewed and updated as appropriate: allergies, current medications, past family history, past medical history, past social history, past surgical history and problem list.    Past Medical History:   Diagnosis Date   • Abdominal pain, LLQ    • Abnormal findings on diagnostic imaging of abdomen    • Acute deep vein thrombosis of lower extremity ([facility])    • Acute sinusitis    • Anxiety and depression    • Attention deficit disorder    • Atypical depressive disorder    • Bloating    • BMI 40.0-44.9, adult ([facility])    • Chest pain    • Chronic pain syndrome    • Colitis    • COPD (chronic obstructive pulmonary disease) ([facility])    • Cramps, muscle, general    • Dehydration    • Depression    • Diarrhea    • Difficulty breathing    • Disc degeneration, lumbosacral    • DVT (deep venous thrombosis) ([facility])     left leg   • DVT, lower extremity, recurrent ([facility])    • Edema    • Fatigue    • Fever    • GERD  (gastroesophageal reflux disease)    • Heartburn    • Hyperlipidemia    • Idiopathic peripheral neuropathy    • Idiopathic urticaria    • Inflamed skin tag    • Insomnia    • Joint pain    • Long-term use of high-risk medication    • Low back pain    • Lumbago    • Lumbosacral neuritis    • Lymphadenitis    • Melanocytic nevus    • Nausea    • Neoplasm of uncertain behavior of skin    • Neuralgia    • Neuritis    • Organic impotence    • Osteoarthritis, multiple sites    • Panic disorder without agoraphobia    • Prothrombin mutation (HCC)    • Pulmonary embolism (HCC)     bilateral lungs   • Shingles    • Shortness of breath        Past Surgical History:   Procedure Laterality Date   • BACK SURGERY N/A     done at [facility] in [place], [place]   • COLONOSCOPY N/A 2017    Procedure: COLONOSCOPY to Cecum;  Surgeon: Denton Matthews MD;  Location: [facility] ENDOSCOPY;  Service:    • ENDOSCOPY N/A 2017    Procedure: ESOPHAGOGASTRODUODENOSCOPY with Bx's;  Surgeon: Denton Matthews MD;  Location: [facility] ENDOSCOPY;  Service:    • KNEE SURGERY Left     Dr. Gloria   • KNEE SURGERY Right    • OTHER SURGICAL HISTORY      post spinal diskect osteophytect lumb interspace microdiscec       Family History   Problem Relation Age of Onset   • Hypertension Mother    • Lung cancer Mother    • Anxiety disorder Mother    • Depression Mother    • Heart disease Mother    • Leukemia Paternal Uncle    • Diabetes Paternal Grandmother    • Prostate cancer Paternal Grandfather    • Hypothyroidism Father    • Prostate cancer Maternal Grandfather        Social History     Socioeconomic History   • Marital status:      Spouse name: Yasmin   Tobacco Use   • Smoking status: Former Smoker     Years: 17.00     Types: Cigarettes     Quit date: 2000     Years since quittin.8   • Smokeless tobacco: Never Used   Substance and Sexual Activity   • Alcohol use: Yes     Comment: Occasionally   • Drug use: No   • Sexual  activity: Yes         Current Outpatient Medications:   •  ALPRAZolam (XANAX) 0.25 MG tablet, Take 1 tablet by mouth Every 12 (Twelve) Hours As Needed for Anxiety., Disp: 30 tablet, Rfl: 0  •  aspirin 81 MG chewable tablet, Chew 81 mg Daily., Disp: , Rfl:   •  Eliquis 5 MG tablet tablet, TAKE ONE TABLET BY MOUTH EVERY 12 HOURS, Disp: 60 tablet, Rfl: 2  •  FIBER PO, Take 1 capsule by mouth As Needed., Disp: , Rfl:   •  fluticasone (FLONASE) 50 MCG/ACT nasal spray, 1 spray into the nostril(s) as directed by provider Daily. (Patient taking differently: 1 spray into the nostril(s) as directed by provider As Needed.), Disp: 18.2 mL, Rfl: 2  •  gabapentin (NEURONTIN) 300 MG capsule, Take 1 capsule by mouth 3 (Three) Times a Day., Disp: 90 capsule, Rfl: 2  •  Omega-3 Fatty Acids (OMEGA 3 PO), Take 1 capsule by mouth Daily., Disp: , Rfl:   •  Zinc Acetate, Oral, (ZINC ACETATE PO), Take  by mouth., Disp: , Rfl:   •  amoxicillin (AMOXIL) 500 MG capsule, Take 2 capsules by mouth 2 (Two) Times a Day for 7 days., Disp: 28 capsule, Rfl: 0  •  ketorolac (ACULAR) 0.5 % ophthalmic solution, Administer 1 drop to the right eye 4 (Four) Times a Day., Disp: 10 mL, Rfl: 0  •  montelukast (Singulair) 10 MG tablet, Take 1 tablet by mouth Every Night., Disp: 30 tablet, Rfl: 1    Current Facility-Administered Medications:   •  cyanocobalamin injection 1,000 mcg, 1,000 mcg, Intramuscular, Once, Kehrer, Meredith Lea, MD    Review of Systems   Constitutional: Negative for chills, fatigue and fever.   HENT: Negative for congestion, ear pain, rhinorrhea and sore throat.    Eyes: Negative for pain and redness.   Respiratory: Negative for cough, chest tightness and wheezing.    Cardiovascular: Negative for chest pain and leg swelling.   Gastrointestinal: Negative for abdominal pain, constipation, diarrhea, nausea and vomiting.   Endocrine: Negative for polydipsia and polyphagia.   Genitourinary: Negative for dysuria and hematuria.    Musculoskeletal: Positive for back pain. Negative for arthralgias and myalgias.   Skin: Negative for color change and rash.   Allergic/Immunologic: Negative.    Neurological: Negative for dizziness, weakness, light-headedness and headaches.   Hematological: Negative.    Psychiatric/Behavioral: Negative for confusion, dysphoric mood and sleep disturbance.       Objective   Vitals:    11/16/21 1310   BP: 154/86   Pulse: 87   Temp: 97.5 °F (36.4 °C)   SpO2: 99%     Body mass index is 40 kg/m².  Physical Exam  Vitals and nursing note reviewed.   Constitutional:       General: He is not in acute distress.     Appearance: Normal appearance. He is well-developed. He is obese.   HENT:      Head: Normocephalic and atraumatic.      Right Ear: Tympanic membrane, ear canal and external ear normal.      Left Ear: Tympanic membrane, ear canal and external ear normal.      Nose: Nose normal.      Mouth/Throat:      Mouth: Mucous membranes are moist.      Pharynx: Oropharynx is clear.      Comments: Purulent PND  Eyes:      Conjunctiva/sclera: Conjunctivae normal.      Pupils: Pupils are equal, round, and reactive to light.   Neck:      Thyroid: No thyromegaly.   Cardiovascular:      Rate and Rhythm: Normal rate and regular rhythm.      Heart sounds: No murmur heard.      Pulmonary:      Effort: Pulmonary effort is normal.      Breath sounds: Normal breath sounds.   Abdominal:      General: Abdomen is flat. Bowel sounds are normal. There is no distension.      Palpations: Abdomen is soft. There is no mass.   Musculoskeletal:         General: Tenderness present. No swelling. Normal range of motion.      Cervical back: Neck supple.      Comments: Low back TTP   Skin:     General: Skin is warm and dry.      Capillary Refill: Capillary refill takes less than 2 seconds.   Neurological:      Mental Status: He is alert and oriented to person, place, and time.   Psychiatric:         Mood and Affect: Mood normal.         Behavior:  Behavior normal.           Assessment/Plan   Diagnoses and all orders for this visit:    1. Routine health maintenance (Primary)    2. Hyperlipidemia, unspecified hyperlipidemia type  -     Lipid Panel  -     CBC & Differential  -     Comprehensive Metabolic Panel    3. Prediabetes  -     TSH  -     Hemoglobin A1c    4. Chronic pain syndrome  -     gabapentin (NEURONTIN) 300 MG capsule; Take 1 capsule by mouth 3 (Three) Times a Day.  Dispense: 90 capsule; Refill: 2    5. Atypical depressive disorder    6. High risk medication use    7. Low serum vitamin B12  -     cyanocobalamin injection 1,000 mcg  -     Vitamin B12    8. Recurrent deep vein thrombosis (DVT) of lower extremity, unspecified laterality (HCC)    9. Screening PSA (prostate specific antigen)  -     PSA Screen    10. Elevated blood pressure reading    11. Acute non-recurrent maxillary sinusitis  -     amoxicillin (AMOXIL) 500 MG capsule; Take 2 capsules by mouth 2 (Two) Times a Day for 7 days.  Dispense: 28 capsule; Refill: 0               Discussed the importance of maintaining a healthy weight and getting regular exercise.  Educated patient on the benefits of healthy diet.  Advise follow-up annually for wellness exams.    There are no Patient Instructions on file for this visit.

## 2021-11-17 LAB
ALBUMIN SERPL-MCNC: 4.2 G/DL (ref 3.8–4.9)
ALBUMIN/GLOB SERPL: 1.6 {RATIO} (ref 1.2–2.2)
ALP SERPL-CCNC: 73 IU/L (ref 44–121)
ALT SERPL-CCNC: 19 IU/L (ref 0–44)
AST SERPL-CCNC: 23 IU/L (ref 0–40)
BASOPHILS # BLD AUTO: 0 X10E3/UL (ref 0–0.2)
BASOPHILS NFR BLD AUTO: 1 %
BILIRUB SERPL-MCNC: 0.6 MG/DL (ref 0–1.2)
BUN SERPL-MCNC: 12 MG/DL (ref 6–24)
BUN/CREAT SERPL: 14 (ref 9–20)
CALCIUM SERPL-MCNC: 9.3 MG/DL (ref 8.7–10.2)
CHLORIDE SERPL-SCNC: 101 MMOL/L (ref 96–106)
CHOLEST SERPL-MCNC: 190 MG/DL (ref 100–199)
CO2 SERPL-SCNC: 26 MMOL/L (ref 20–29)
CREAT SERPL-MCNC: 0.88 MG/DL (ref 0.76–1.27)
EOSINOPHIL # BLD AUTO: 0.1 X10E3/UL (ref 0–0.4)
EOSINOPHIL NFR BLD AUTO: 2 %
ERYTHROCYTE [DISTWIDTH] IN BLOOD BY AUTOMATED COUNT: 12.1 % (ref 11.6–15.4)
GLOBULIN SER CALC-MCNC: 2.6 G/DL (ref 1.5–4.5)
GLUCOSE SERPL-MCNC: 84 MG/DL (ref 65–99)
HBA1C MFR BLD: 5.9 % (ref 4.8–5.6)
HCT VFR BLD AUTO: 45.7 % (ref 37.5–51)
HDLC SERPL-MCNC: 41 MG/DL
HGB BLD-MCNC: 15.1 G/DL (ref 13–17.7)
IMM GRANULOCYTES # BLD AUTO: 0 X10E3/UL (ref 0–0.1)
IMM GRANULOCYTES NFR BLD AUTO: 0 %
LDLC SERPL CALC-MCNC: 136 MG/DL (ref 0–99)
LYMPHOCYTES # BLD AUTO: 2.1 X10E3/UL (ref 0.7–3.1)
LYMPHOCYTES NFR BLD AUTO: 28 %
MCH RBC QN AUTO: 29.9 PG (ref 26.6–33)
MCHC RBC AUTO-ENTMCNC: 33 G/DL (ref 31.5–35.7)
MCV RBC AUTO: 91 FL (ref 79–97)
MONOCYTES # BLD AUTO: 0.6 X10E3/UL (ref 0.1–0.9)
MONOCYTES NFR BLD AUTO: 7 %
NEUTROPHILS # BLD AUTO: 4.9 X10E3/UL (ref 1.4–7)
NEUTROPHILS NFR BLD AUTO: 62 %
PLATELET # BLD AUTO: 276 X10E3/UL (ref 150–450)
POTASSIUM SERPL-SCNC: 4.5 MMOL/L (ref 3.5–5.2)
PROT SERPL-MCNC: 6.8 G/DL (ref 6–8.5)
PSA SERPL-MCNC: 1 NG/ML (ref 0–4)
RBC # BLD AUTO: 5.05 X10E6/UL (ref 4.14–5.8)
SODIUM SERPL-SCNC: 139 MMOL/L (ref 134–144)
TRIGL SERPL-MCNC: 68 MG/DL (ref 0–149)
TSH SERPL DL<=0.005 MIU/L-ACNC: 2.07 UIU/ML (ref 0.45–4.5)
VIT B12 SERPL-MCNC: 467 PG/ML (ref 232–1245)
VLDLC SERPL CALC-MCNC: 13 MG/DL (ref 5–40)
WBC # BLD AUTO: 7.7 X10E3/UL (ref 3.4–10.8)

## 2021-12-12 DIAGNOSIS — G89.4 CHRONIC PAIN SYNDROME: ICD-10-CM

## 2021-12-13 RX ORDER — APIXABAN 5 MG/1
TABLET, FILM COATED ORAL
Qty: 60 TABLET | Refills: 2 | Status: SHIPPED | OUTPATIENT
Start: 2021-12-13 | End: 2022-03-07

## 2021-12-13 RX ORDER — GABAPENTIN 300 MG/1
CAPSULE ORAL
Qty: 90 CAPSULE | Refills: 0 | Status: SHIPPED | OUTPATIENT
Start: 2021-12-13 | End: 2022-04-13

## 2021-12-17 ENCOUNTER — OFFICE VISIT (OUTPATIENT)
Dept: FAMILY MEDICINE CLINIC | Facility: CLINIC | Age: 56
End: 2021-12-17

## 2021-12-17 VITALS
DIASTOLIC BLOOD PRESSURE: 78 MMHG | HEIGHT: 70 IN | HEART RATE: 74 BPM | BODY MASS INDEX: 39.51 KG/M2 | TEMPERATURE: 98.7 F | WEIGHT: 276 LBS | SYSTOLIC BLOOD PRESSURE: 146 MMHG | OXYGEN SATURATION: 96 %

## 2021-12-17 DIAGNOSIS — J01.00 ACUTE MAXILLARY SINUSITIS, RECURRENCE NOT SPECIFIED: Primary | ICD-10-CM

## 2021-12-17 PROCEDURE — 99213 OFFICE O/P EST LOW 20 MIN: CPT | Performed by: NURSE PRACTITIONER

## 2021-12-17 RX ORDER — AMOXICILLIN AND CLAVULANATE POTASSIUM 875; 125 MG/1; MG/1
1 TABLET, FILM COATED ORAL 2 TIMES DAILY
Qty: 20 TABLET | Refills: 0 | Status: SHIPPED | OUTPATIENT
Start: 2021-12-17 | End: 2022-07-14

## 2021-12-17 NOTE — PROGRESS NOTES
"Chief Complaint  Sinusitis (was given antibiotic about a month ago that helped but now its back) and Headache (working in [employer]aden house and burning wood stove, )    Subjective          Rashid Maldonado presents to [facility]  History of Present Illness    Patient is here today with complaint of sinus pressure and headache. 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  States that it has been bothering him for a few days, but last night teeth started bothering him and coughing up green drainage.      Denies fever, shortness of breath worse than normal.     OTC: flonase daily   Doesn't use singulair much      Objective   Vital Signs:   /78   Pulse 74   Temp 98.7 °F (37.1 °C)   Ht 177.8 cm (70\")   Wt 125 kg (276 lb)   SpO2 96%   BMI 39.60 kg/m²     Physical Exam  Constitutional:       Appearance: Normal appearance.   HENT:      Head: Normocephalic and atraumatic.      Right Ear: Tympanic membrane has decreased mobility.      Left Ear: Tympanic membrane has decreased mobility.      Nose: Congestion and rhinorrhea present.      Right Sinus: Maxillary sinus tenderness present.      Left Sinus: Maxillary sinus tenderness present.      Mouth/Throat:      Comments: Post nasal drainage noted  Cardiovascular:      Rate and Rhythm: Normal rate and regular rhythm.   Pulmonary:      Effort: Pulmonary effort is normal.      Breath sounds: Normal breath sounds.   Neurological:      Mental Status: He is alert and oriented to person, place, and time.   Psychiatric:         Mood and Affect: Mood normal.         Behavior: Behavior normal.         Thought Content: Thought content normal.         Judgment: Judgment normal.        Result Review :                 Assessment and Plan    Diagnoses and all orders for this visit:    1. Acute maxillary sinusitis, recurrence not specified (Primary)    Other orders  -     amoxicillin-clavulanate (AUGMENTIN) 875-125 MG per tablet; " Take 1 tablet by mouth 2 (Two) Times a Day.  Dispense: 20 tablet; Refill: 0      Will treat for sinus infection with antibiotic.  Encourage patient to use Flonase and Singulair daily.  Follow-up as needed.      Follow Up   No follow-ups on file.  Patient was given instructions and counseling regarding his condition or for health maintenance advice. Please see specific information pulled into the AVS if appropriate.

## 2022-02-08 ENCOUNTER — TELEPHONE (OUTPATIENT)
Dept: FAMILY MEDICINE CLINIC | Facility: CLINIC | Age: 57
End: 2022-02-08

## 2022-02-08 NOTE — TELEPHONE ENCOUNTER
Eliquis only needs to be held for just over 24 hours for procedures with low to minimal bleeding risk and over 48 hours for procedures with moderate to high bleeding rest.

## 2022-03-07 RX ORDER — APIXABAN 5 MG/1
TABLET, FILM COATED ORAL
Qty: 60 TABLET | Refills: 2 | Status: SHIPPED | OUTPATIENT
Start: 2022-03-07 | End: 2022-06-14 | Stop reason: SDUPTHER

## 2022-04-12 DIAGNOSIS — G89.4 CHRONIC PAIN SYNDROME: ICD-10-CM

## 2022-04-13 RX ORDER — GABAPENTIN 300 MG/1
CAPSULE ORAL
Qty: 90 CAPSULE | Refills: 0 | Status: SHIPPED | OUTPATIENT
Start: 2022-04-13 | End: 2022-07-14 | Stop reason: SDUPTHER

## 2022-05-16 DIAGNOSIS — G89.4 CHRONIC PAIN SYNDROME: ICD-10-CM

## 2022-05-17 RX ORDER — GABAPENTIN 300 MG/1
CAPSULE ORAL
Qty: 90 CAPSULE | OUTPATIENT
Start: 2022-05-17

## 2022-07-09 DIAGNOSIS — G89.4 CHRONIC PAIN SYNDROME: ICD-10-CM

## 2022-07-11 RX ORDER — APIXABAN 5 MG/1
TABLET, FILM COATED ORAL
Qty: 60 TABLET | Refills: 0 | OUTPATIENT
Start: 2022-07-11

## 2022-07-11 RX ORDER — GABAPENTIN 300 MG/1
CAPSULE ORAL
Qty: 90 CAPSULE | OUTPATIENT
Start: 2022-07-11

## 2022-07-14 ENCOUNTER — OFFICE VISIT (OUTPATIENT)
Dept: FAMILY MEDICINE CLINIC | Facility: CLINIC | Age: 57
End: 2022-07-14

## 2022-07-14 VITALS
HEIGHT: 70 IN | OXYGEN SATURATION: 99 % | BODY MASS INDEX: 39.88 KG/M2 | TEMPERATURE: 98.7 F | HEART RATE: 70 BPM | DIASTOLIC BLOOD PRESSURE: 82 MMHG | SYSTOLIC BLOOD PRESSURE: 148 MMHG | WEIGHT: 278.6 LBS

## 2022-07-14 DIAGNOSIS — G89.4 CHRONIC PAIN SYNDROME: Primary | ICD-10-CM

## 2022-07-14 DIAGNOSIS — J30.1 SEASONAL ALLERGIC RHINITIS DUE TO POLLEN: ICD-10-CM

## 2022-07-14 DIAGNOSIS — F41.0 PANIC DISORDER WITHOUT AGORAPHOBIA: ICD-10-CM

## 2022-07-14 DIAGNOSIS — Z79.899 HIGH RISK MEDICATION USE: ICD-10-CM

## 2022-07-14 DIAGNOSIS — D68.59 HYPERCOAGULABLE STATE: ICD-10-CM

## 2022-07-14 PROCEDURE — 99214 OFFICE O/P EST MOD 30 MIN: CPT | Performed by: NURSE PRACTITIONER

## 2022-07-14 RX ORDER — GABAPENTIN 300 MG/1
300 CAPSULE ORAL 3 TIMES DAILY
Qty: 90 CAPSULE | Refills: 2 | Status: SHIPPED | OUTPATIENT
Start: 2022-07-14 | End: 2022-10-17

## 2022-07-14 NOTE — PROGRESS NOTES
"Chief Complaint  Med Refill (Needs UDS for gabapentin/)    Subjective        Rashid Maldonado presents to [facility]  History of Present Illness    Patient is a patient of Dr. Meredith Kehrer.  He is here today for follow up on chronic conditions.  Has no new complaints.       Allergies-flonase and then singulair as needed.     Anxiety: still has at least 10 left.  Ok there.     Blood thinner. Denies any issues with eliquid    Chronic pain syndrome-gabapentin at 900mg daily works well for her. Denies any issues with medication.    Alberto-today  UDS-today  Consent-11/16/2021    Objective   Vital Signs:  /82   Pulse 70   Temp 98.7 °F (37.1 °C)   Ht 177.8 cm (70\")   Wt 126 kg (278 lb 9.6 oz)   SpO2 99%   BMI 39.97 kg/m²   Estimated body mass index is 39.97 kg/m² as calculated from the following:    Height as of this encounter: 177.8 cm (70\").    Weight as of this encounter: 126 kg (278 lb 9.6 oz).           Physical Exam  Constitutional:       Appearance: Normal appearance.   Cardiovascular:      Rate and Rhythm: Normal rate and regular rhythm.   Pulmonary:      Effort: Pulmonary effort is normal.      Breath sounds: Normal breath sounds.   Neurological:      Mental Status: He is alert and oriented to person, place, and time.   Psychiatric:         Mood and Affect: Mood normal.         Behavior: Behavior normal.         Thought Content: Thought content normal.         Judgment: Judgment normal.        Result Review :                Assessment and Plan   Diagnoses and all orders for this visit:    1. Chronic pain syndrome (Primary)  -     Drug Profile 701937 - Urine, Clean Catch  -     gabapentin (NEURONTIN) 300 MG capsule; Take 1 capsule by mouth 3 (Three) Times a Day.  Dispense: 90 capsule; Refill: 2    2. Seasonal allergic rhinitis due to pollen    3. Panic disorder without agoraphobia    4. Hypercoagulable state (HCC)    5. High risk medication use  -     Drug Profile [id number] " - Urine, Clean Catch    Other orders  -     apixaban (Eliquis) 5 MG tablet tablet; Take 1 tablet by mouth Every 12 (Twelve) Hours.  Dispense: 60 tablet; Refill: 5      Allergies-flonase and then singulair as needed.  Continue.  No refills needed currently    Anxiety: still has at least 10 left.  Ok there.  Continue as needed.  No refills needed currently    Blood thinner. Denies any issues with eliquis.  Refills given.    Chronic pain syndrome-gabapentin at 900mg daily works well for her. Denies any issues with medication.    Alberto-today  UDS-today  Consent-11/16/2021  Refills given.    Follow-up in 3 months with Dr. Kehrer, sooner if needed.         Follow Up   Return in about 3 months (around 10/14/2022), or if symptoms worsen or fail to improve, for Next scheduled follow up.  Patient was given instructions and counseling regarding his condition or for health maintenance advice. Please see specific information pulled into the AVS if appropriate.

## 2022-07-24 LAB
AMPHETAMINES UR QL SCN: NEGATIVE NG/ML
BARBITURATES UR QL: NEGATIVE NG/ML
BENZODIAZ UR QL SCN: NEGATIVE NG/ML
BZE UR QL: NEGATIVE NG/ML
CANNABINOIDS UR QL CFM: POSITIVE
CREAT UR-MCNC: 157.4 MG/DL (ref 20–300)
ETHANOL UR-MCNC: NEGATIVE %
MEPERIDINE UR QL: NEGATIVE NG/ML
METHADONE UR QL: NEGATIVE NG/ML
OPIATES UR QL SCN: NEGATIVE NG/ML
OXYCODONE+OXYMORPHONE UR QL SCN: NEGATIVE NG/ML
PCP UR QL: NEGATIVE NG/ML
PROPOXYPH UR QL: NEGATIVE NG/ML
TRAMADOL UR QL SCN: NEGATIVE NG/ML

## 2022-10-16 DIAGNOSIS — G89.4 CHRONIC PAIN SYNDROME: ICD-10-CM

## 2022-10-17 RX ORDER — GABAPENTIN 300 MG/1
CAPSULE ORAL
Qty: 90 CAPSULE | Refills: 0 | Status: SHIPPED | OUTPATIENT
Start: 2022-10-17 | End: 2023-02-22

## 2022-10-18 ENCOUNTER — OFFICE VISIT (OUTPATIENT)
Dept: FAMILY MEDICINE CLINIC | Facility: CLINIC | Age: 57
End: 2022-10-18

## 2022-10-18 VITALS
WEIGHT: 285 LBS | BODY MASS INDEX: 40.8 KG/M2 | OXYGEN SATURATION: 99 % | SYSTOLIC BLOOD PRESSURE: 144 MMHG | HEIGHT: 70 IN | DIASTOLIC BLOOD PRESSURE: 82 MMHG | HEART RATE: 59 BPM | TEMPERATURE: 97.3 F

## 2022-10-18 DIAGNOSIS — E53.8 LOW SERUM VITAMIN B12: ICD-10-CM

## 2022-10-18 DIAGNOSIS — R73.03 PREDIABETES: ICD-10-CM

## 2022-10-18 DIAGNOSIS — E78.5 HYPERLIPIDEMIA, UNSPECIFIED HYPERLIPIDEMIA TYPE: ICD-10-CM

## 2022-10-18 DIAGNOSIS — G89.4 CHRONIC PAIN SYNDROME: Primary | ICD-10-CM

## 2022-10-18 DIAGNOSIS — D68.59 HYPERCOAGULABLE STATE: ICD-10-CM

## 2022-10-18 DIAGNOSIS — F32.89 ATYPICAL DEPRESSIVE DISORDER: ICD-10-CM

## 2022-10-18 DIAGNOSIS — Z79.899 HIGH RISK MEDICATION USE: ICD-10-CM

## 2022-10-18 PROCEDURE — 99214 OFFICE O/P EST MOD 30 MIN: CPT | Performed by: FAMILY MEDICINE

## 2022-10-18 RX ORDER — MIRTAZAPINE 15 MG/1
15 TABLET, FILM COATED ORAL NIGHTLY
Qty: 30 TABLET | Refills: 1 | Status: SHIPPED | OUTPATIENT
Start: 2022-10-18 | End: 2022-11-17 | Stop reason: SINTOL

## 2022-10-18 NOTE — PROGRESS NOTES
Chief Complaint  Med Refill, Hypertension, Depression, COPD, and Anxiety    Subjective        Rashid Maldonado presents to [facility] PR[facility]  Hypertension  Associated symptoms include anxiety.   Depression  COPD    Anxiety      His past medical history is significant for depression.     The patient presents today for medication refills.    Neuropathy  The patient states his pain has worsened, but he has to live with it. He states he is taking gabapentin, but not every day. The patient states he can not take it 3 times a day because it makes him feel lethargic. He states if he does not take it, his feet sting so bad anymore. The patient states he started weaning himself down a few months ago and it lasted about a week and a half.    Anxiety  The patient states some mornings he wakes up and it feels like someone is standing on his chest. He states sometimes he does not want to get up and do anything. The patient states he has had a rough year this year. He states he has tried Wellbutrin for smoking cessation. The patient states he has tried Prozac, but it did not work. He states he has tried Cymbalta in the past, but he was starting to lose a lot of weight and they took him back off of it. The patient states he does not mind losing the weight. He states he is in a lot of financial problems and he can not afford it. He states he thinks his financial problems are tied to his financial wellness. The patient states he has never heard of Remeron. He states he has seen a psychiatrist in the past.    Weight gain  The patient states he has picked up some weight again. He states he has been in the truck for about 2 months and has ate a lot of [employer]. The patient states he has been trying to watch his diet. He states he has been drinking energy drinks from a [employer] store. The patient states he feels better within an hour of drinking them. He states he has a lot of energy, but they do not last long. The  "patient states he does not like drinking a lot of it. He states he bought some dissolvable B12 that go under his tongue and he has not tried them yet.    A review of systems was performed, and the pertinent positives are noted in the HPI.    Objective   Vital Signs:  /82   Pulse 59   Temp 97.3 °F (36.3 °C)   Ht 177.8 cm (70\")   Wt 129 kg (285 lb)   SpO2 99%   BMI 40.89 kg/m²   Estimated body mass index is 40.89 kg/m² as calculated from the following:    Height as of this encounter: 177.8 cm (70\").    Weight as of this encounter: 129 kg (285 lb).          Physical Exam  Constitutional:       General: He is not in acute distress.     Appearance: He is well-developed.   HENT:      Head: Normocephalic and atraumatic.      Right Ear: Tympanic membrane, ear canal and external ear normal.      Left Ear: Tympanic membrane, ear canal and external ear normal.      Nose: Nose normal.      Mouth/Throat:      Mouth: Mucous membranes are moist.      Pharynx: Oropharynx is clear.   Eyes:      Conjunctiva/sclera: Conjunctivae normal.      Pupils: Pupils are equal, round, and reactive to light.   Cardiovascular:      Rate and Rhythm: Normal rate and regular rhythm.      Heart sounds: No murmur heard.  Pulmonary:      Effort: Pulmonary effort is normal. No respiratory distress.      Breath sounds: Normal breath sounds.   Musculoskeletal:      Cervical back: Neck supple.      Right lower leg: No edema.      Left lower leg: No edema.   Lymphadenopathy:      Cervical: No cervical adenopathy.   Neurological:      Mental Status: He is alert and oriented to person, place, and time.   Psychiatric:         Mood and Affect: Mood normal.         Behavior: Behavior normal.        Result Review :{Labs  Result Review  Imaging  Med Tab  Media  Procedures  :23}                Assessment and Plan   Diagnoses and all orders for this visit:    1. Chronic pain syndrome (Primary)    2. High risk medication use    3. Hypercoagulable " state (HCC)    4. Hyperlipidemia, unspecified hyperlipidemia type  -     Lipid Panel  -     Comprehensive Metabolic Panel    5. Prediabetes  -     Lipid Panel  -     CBC & Differential  -     Comprehensive Metabolic Panel  -     TSH  -     Hemoglobin A1c    6. Atypical depressive disorder  Assessment & Plan:  - The patient will try Remeron and followup in a month.    Orders:  -     mirtazapine (Remeron) 15 MG tablet; Take 1 tablet by mouth Every Night.  Dispense: 30 tablet; Refill: 1    7. Low serum vitamin B12  -     Vitamin B12           Follow Up   Return in about 1 month (around 11/18/2022) for Recheck mood.  Patient was given instructions and counseling regarding his condition or for health maintenance advice. Please see specific information pulled into the AVS if appropriate.     Transcribed from ambient dictation for Meredith Lea Kehrer, MD by Taylor Humes.  10/18/22   10:34 EDT    Patient or patient representative verbalized consent to the visit recording.  I have personally performed the services described in this document as transcribed by the above individual, and it is both accurate and complete.

## 2022-10-19 LAB
ALBUMIN SERPL-MCNC: 4.5 G/DL (ref 3.5–5.2)
ALBUMIN/GLOB SERPL: 1.8 G/DL
ALP SERPL-CCNC: 75 U/L (ref 39–117)
ALT SERPL-CCNC: 20 U/L (ref 1–41)
AST SERPL-CCNC: 24 U/L (ref 1–40)
BASOPHILS # BLD AUTO: 0.04 10*3/MM3 (ref 0–0.2)
BASOPHILS NFR BLD AUTO: 0.7 % (ref 0–1.5)
BILIRUB SERPL-MCNC: 0.4 MG/DL (ref 0–1.2)
BUN SERPL-MCNC: 16 MG/DL (ref 6–20)
BUN/CREAT SERPL: 19.5 (ref 7–25)
CALCIUM SERPL-MCNC: 9.3 MG/DL (ref 8.6–10.5)
CHLORIDE SERPL-SCNC: 101 MMOL/L (ref 98–107)
CHOLEST SERPL-MCNC: 230 MG/DL (ref 0–200)
CO2 SERPL-SCNC: 28 MMOL/L (ref 22–29)
CREAT SERPL-MCNC: 0.82 MG/DL (ref 0.76–1.27)
EGFRCR SERPLBLD CKD-EPI 2021: 101.8 ML/MIN/1.73
EOSINOPHIL # BLD AUTO: 0.23 10*3/MM3 (ref 0–0.4)
EOSINOPHIL NFR BLD AUTO: 3.8 % (ref 0.3–6.2)
ERYTHROCYTE [DISTWIDTH] IN BLOOD BY AUTOMATED COUNT: 11.8 % (ref 12.3–15.4)
GLOBULIN SER CALC-MCNC: 2.5 GM/DL
GLUCOSE SERPL-MCNC: 93 MG/DL (ref 65–99)
HBA1C MFR BLD: 5.8 % (ref 4.8–5.6)
HCT VFR BLD AUTO: 47.5 % (ref 37.5–51)
HDLC SERPL-MCNC: 47 MG/DL (ref 40–60)
HGB BLD-MCNC: 16.4 G/DL (ref 13–17.7)
IMM GRANULOCYTES # BLD AUTO: 0.04 10*3/MM3 (ref 0–0.05)
IMM GRANULOCYTES NFR BLD AUTO: 0.7 % (ref 0–0.5)
LDLC SERPL CALC-MCNC: 167 MG/DL (ref 0–100)
LYMPHOCYTES # BLD AUTO: 2.04 10*3/MM3 (ref 0.7–3.1)
LYMPHOCYTES NFR BLD AUTO: 34.1 % (ref 19.6–45.3)
MCH RBC QN AUTO: 31.7 PG (ref 26.6–33)
MCHC RBC AUTO-ENTMCNC: 34.5 G/DL (ref 31.5–35.7)
MCV RBC AUTO: 91.9 FL (ref 79–97)
MONOCYTES # BLD AUTO: 0.54 10*3/MM3 (ref 0.1–0.9)
MONOCYTES NFR BLD AUTO: 9 % (ref 5–12)
NEUTROPHILS # BLD AUTO: 3.1 10*3/MM3 (ref 1.7–7)
NEUTROPHILS NFR BLD AUTO: 51.7 % (ref 42.7–76)
NRBC BLD AUTO-RTO: 0 /100 WBC (ref 0–0.2)
PLATELET # BLD AUTO: 254 10*3/MM3 (ref 140–450)
POTASSIUM SERPL-SCNC: 4.9 MMOL/L (ref 3.5–5.2)
PROT SERPL-MCNC: 7 G/DL (ref 6–8.5)
RBC # BLD AUTO: 5.17 10*6/MM3 (ref 4.14–5.8)
SODIUM SERPL-SCNC: 140 MMOL/L (ref 136–145)
TRIGL SERPL-MCNC: 89 MG/DL (ref 0–150)
TSH SERPL DL<=0.005 MIU/L-ACNC: 1.72 UIU/ML (ref 0.27–4.2)
VIT B12 SERPL-MCNC: 354 PG/ML (ref 211–946)
VLDLC SERPL CALC-MCNC: 16 MG/DL (ref 5–40)
WBC # BLD AUTO: 5.99 10*3/MM3 (ref 3.4–10.8)

## 2022-11-17 ENCOUNTER — OFFICE VISIT (OUTPATIENT)
Dept: FAMILY MEDICINE CLINIC | Facility: CLINIC | Age: 57
End: 2022-11-17

## 2022-11-17 VITALS
BODY MASS INDEX: 41.29 KG/M2 | DIASTOLIC BLOOD PRESSURE: 84 MMHG | HEART RATE: 65 BPM | SYSTOLIC BLOOD PRESSURE: 140 MMHG | WEIGHT: 288.4 LBS | TEMPERATURE: 96.8 F | OXYGEN SATURATION: 98 % | HEIGHT: 70 IN

## 2022-11-17 DIAGNOSIS — B35.9 DERMATOPHYTOSIS: ICD-10-CM

## 2022-11-17 DIAGNOSIS — E53.8 LOW SERUM VITAMIN B12: ICD-10-CM

## 2022-11-17 DIAGNOSIS — F32.89 ATYPICAL DEPRESSIVE DISORDER: Primary | ICD-10-CM

## 2022-11-17 DIAGNOSIS — E78.5 HYPERLIPIDEMIA, UNSPECIFIED HYPERLIPIDEMIA TYPE: ICD-10-CM

## 2022-11-17 DIAGNOSIS — R73.03 PREDIABETES: ICD-10-CM

## 2022-11-17 PROCEDURE — 99214 OFFICE O/P EST MOD 30 MIN: CPT | Performed by: FAMILY MEDICINE

## 2022-11-17 NOTE — PROGRESS NOTES
"Chief Complaint  Med Refill and Rash (Right arm )    Subjective        Rashid Maldonado presents to [facility]  History of Present Illness   The patient presents today for a 1-month follow-up on a trial of a new antidepressant.    Depression  The patient states he took 0.5 mg of Remeron one night and slept for 2 days. He states he started a new supplement earlier in the month and stopped taking it and his head cleared back up. He states his mood is better even without any medication. He states he is having a lot of financial burden right now. He states that he was currently taking [employer] mushrooms. He admits to after taking the supplement it messes with his head and even as a vitamin. He states after he threw it away 3 days later his eyes were bright and started to feel better.    Rash  The patient states he has a rash on his right forearm that has been present for approximately 1 month. He states he went to see his dermatologist and was given fluocinonide cream. He states that he was told that it could be a ringworm. He states that someone gave him a cream 10 years ago and he use it on his face. He states he was having better clearing with peroxide and rubbing alcohol.    Objective   Vital Signs:  /84   Pulse 65   Temp 96.8 °F (36 °C)   Ht 177.8 cm (70\")   Wt 131 kg (288 lb 6.4 oz)   SpO2 98%   BMI 41.38 kg/m²   Estimated body mass index is 41.38 kg/m² as calculated from the following:    Height as of this encounter: 177.8 cm (70\").    Weight as of this encounter: 131 kg (288 lb 6.4 oz).          Physical Exam  Constitutional:       General: He is not in acute distress.     Appearance: He is well-developed. He is obese.   HENT:      Head: Normocephalic and atraumatic.      Right Ear: Tympanic membrane, ear canal and external ear normal.      Left Ear: Tympanic membrane, ear canal and external ear normal.      Nose: Nose normal.      Mouth/Throat:      Mouth: Mucous " membranes are moist.      Pharynx: Oropharynx is clear.   Eyes:      Conjunctiva/sclera: Conjunctivae normal.      Pupils: Pupils are equal, round, and reactive to light.   Cardiovascular:      Rate and Rhythm: Normal rate and regular rhythm.      Heart sounds: No murmur heard.  Pulmonary:      Effort: Pulmonary effort is normal. No respiratory distress.      Breath sounds: Normal breath sounds.   Musculoskeletal:      Cervical back: Neck supple.      Right lower leg: No edema.      Left lower leg: No edema.   Lymphadenopathy:      Cervical: No cervical adenopathy.   Skin:     Findings: Rash present.   Neurological:      Mental Status: He is alert and oriented to person, place, and time.   Psychiatric:         Mood and Affect: Mood normal.         Behavior: Behavior normal.        Result Review :               Right forearm with a 3 to x 4 cm well circumscribed lesion with erythema, raised borders and scale.    Assessment and Plan   Diagnoses and all orders for this visit:    1. Atypical depressive disorder (Primary)    2. Dermatophytosis  -     econazole nitrate (SPECTAZOLE) 1 % cream; Apply 1 application topically to the appropriate area as directed Daily.  Dispense: 30 g; Refill: 2    3. Low serum vitamin B12    4. Prediabetes    5. Hyperlipidemia, unspecified hyperlipidemia type    1. Dermatophytosis  - The patient was prescribed to a cream.  - The patient was advised to use the cream until the rash goes away and for 2 weeks afterwards.    2. Atypical depressive disorder  - The patient will continue gabapentin and alprazolam as needed.    The patient will follow-up in 3 months for a physical and we will recheck the cholesterol, B12 and prediabetes. The patient was advised to fasting prior to his next appointment.        Transcribed from ambient dictation for Meredith Lea Kehrer, MD by Parvin Gonzáles.  11/17/22   13:14 EST    Patient or patient representative verbalized consent to the visit recording.  I have  personally performed the services described in this document as transcribed by the above individual, and it is both accurate and complete.    Follow Up   Return in about 3 months (around 2/17/2023) for Annual physical.  Patient was given instructions and counseling regarding his condition or for health maintenance advice. Please see specific information pulled into the AVS if appropriate.

## 2022-11-18 ENCOUNTER — TELEPHONE (OUTPATIENT)
Dept: FAMILY MEDICINE CLINIC | Facility: CLINIC | Age: 57
End: 2022-11-18

## 2023-01-23 RX ORDER — APIXABAN 5 MG/1
TABLET, FILM COATED ORAL
Qty: 60 TABLET | Refills: 2 | Status: SHIPPED | OUTPATIENT
Start: 2023-01-23

## 2023-02-22 ENCOUNTER — OFFICE VISIT (OUTPATIENT)
Dept: FAMILY MEDICINE CLINIC | Facility: CLINIC | Age: 58
End: 2023-02-22
Payer: COMMERCIAL

## 2023-02-22 VITALS
BODY MASS INDEX: 41.97 KG/M2 | HEART RATE: 64 BPM | SYSTOLIC BLOOD PRESSURE: 122 MMHG | WEIGHT: 293.2 LBS | OXYGEN SATURATION: 99 % | TEMPERATURE: 99.1 F | HEIGHT: 70 IN | DIASTOLIC BLOOD PRESSURE: 64 MMHG

## 2023-02-22 DIAGNOSIS — Z00.01 ENCOUNTER FOR ANNUAL GENERAL MEDICAL EXAMINATION WITH ABNORMAL FINDINGS IN ADULT: Primary | ICD-10-CM

## 2023-02-22 DIAGNOSIS — R73.03 PREDIABETES: ICD-10-CM

## 2023-02-22 DIAGNOSIS — E53.8 LOW SERUM VITAMIN B12: ICD-10-CM

## 2023-02-22 DIAGNOSIS — I82.409 RECURRENT DEEP VEIN THROMBOSIS (DVT) OF LOWER EXTREMITY, UNSPECIFIED LATERALITY: ICD-10-CM

## 2023-02-22 DIAGNOSIS — F41.0 PANIC DISORDER WITHOUT AGORAPHOBIA: ICD-10-CM

## 2023-02-22 DIAGNOSIS — D68.59 HYPERCOAGULABLE STATE: ICD-10-CM

## 2023-02-22 DIAGNOSIS — G89.4 CHRONIC PAIN SYNDROME: ICD-10-CM

## 2023-02-22 DIAGNOSIS — Z12.5 SCREENING PSA (PROSTATE SPECIFIC ANTIGEN): ICD-10-CM

## 2023-02-22 DIAGNOSIS — Z79.899 HIGH RISK MEDICATION USE: ICD-10-CM

## 2023-02-22 DIAGNOSIS — F32.89 ATYPICAL DEPRESSIVE DISORDER: ICD-10-CM

## 2023-02-22 DIAGNOSIS — E78.5 HYPERLIPIDEMIA, UNSPECIFIED HYPERLIPIDEMIA TYPE: ICD-10-CM

## 2023-02-22 LAB
ALBUMIN SERPL-MCNC: 4.2 G/DL (ref 3.5–5.2)
ALBUMIN/GLOB SERPL: 1.7 G/DL
ALP SERPL-CCNC: 67 U/L (ref 39–117)
ALT SERPL-CCNC: 22 U/L (ref 1–41)
AST SERPL-CCNC: 21 U/L (ref 1–40)
BASOPHILS # BLD AUTO: 0.03 10*3/MM3 (ref 0–0.2)
BASOPHILS NFR BLD AUTO: 0.5 % (ref 0–1.5)
BILIRUB SERPL-MCNC: 0.6 MG/DL (ref 0–1.2)
BUN SERPL-MCNC: 16 MG/DL (ref 6–20)
BUN/CREAT SERPL: 18.2 (ref 7–25)
CALCIUM SERPL-MCNC: 9.3 MG/DL (ref 8.6–10.5)
CHLORIDE SERPL-SCNC: 105 MMOL/L (ref 98–107)
CHOLEST SERPL-MCNC: 203 MG/DL (ref 0–200)
CO2 SERPL-SCNC: 28.6 MMOL/L (ref 22–29)
CREAT SERPL-MCNC: 0.88 MG/DL (ref 0.76–1.27)
EGFRCR SERPLBLD CKD-EPI 2021: 99.7 ML/MIN/1.73
EOSINOPHIL # BLD AUTO: 0.17 10*3/MM3 (ref 0–0.4)
EOSINOPHIL NFR BLD AUTO: 3.1 % (ref 0.3–6.2)
ERYTHROCYTE [DISTWIDTH] IN BLOOD BY AUTOMATED COUNT: 12 % (ref 12.3–15.4)
GLOBULIN SER CALC-MCNC: 2.5 GM/DL
GLUCOSE SERPL-MCNC: 109 MG/DL (ref 65–99)
HBA1C MFR BLD: 5.7 % (ref 4.8–5.6)
HCT VFR BLD AUTO: 43.4 % (ref 37.5–51)
HDLC SERPL-MCNC: 47 MG/DL (ref 40–60)
HGB BLD-MCNC: 15 G/DL (ref 13–17.7)
IMM GRANULOCYTES # BLD AUTO: 0.01 10*3/MM3 (ref 0–0.05)
IMM GRANULOCYTES NFR BLD AUTO: 0.2 % (ref 0–0.5)
LDLC SERPL CALC-MCNC: 138 MG/DL (ref 0–100)
LYMPHOCYTES # BLD AUTO: 1.8 10*3/MM3 (ref 0.7–3.1)
LYMPHOCYTES NFR BLD AUTO: 32.5 % (ref 19.6–45.3)
MCH RBC QN AUTO: 31.4 PG (ref 26.6–33)
MCHC RBC AUTO-ENTMCNC: 34.6 G/DL (ref 31.5–35.7)
MCV RBC AUTO: 91 FL (ref 79–97)
MONOCYTES # BLD AUTO: 0.5 10*3/MM3 (ref 0.1–0.9)
MONOCYTES NFR BLD AUTO: 9 % (ref 5–12)
NEUTROPHILS # BLD AUTO: 3.02 10*3/MM3 (ref 1.7–7)
NEUTROPHILS NFR BLD AUTO: 54.7 % (ref 42.7–76)
NRBC BLD AUTO-RTO: 0 /100 WBC (ref 0–0.2)
PLATELET # BLD AUTO: 215 10*3/MM3 (ref 140–450)
POTASSIUM SERPL-SCNC: 4.6 MMOL/L (ref 3.5–5.2)
PROT SERPL-MCNC: 6.7 G/DL (ref 6–8.5)
PSA SERPL-MCNC: 1.09 NG/ML (ref 0–4)
RBC # BLD AUTO: 4.77 10*6/MM3 (ref 4.14–5.8)
SODIUM SERPL-SCNC: 142 MMOL/L (ref 136–145)
TRIGL SERPL-MCNC: 99 MG/DL (ref 0–150)
TSH SERPL DL<=0.005 MIU/L-ACNC: 2.5 UIU/ML (ref 0.27–4.2)
VIT B12 SERPL-MCNC: 384 PG/ML (ref 211–946)
VLDLC SERPL CALC-MCNC: 18 MG/DL (ref 5–40)
WBC # BLD AUTO: 5.53 10*3/MM3 (ref 3.4–10.8)

## 2023-02-22 PROCEDURE — 99396 PREV VISIT EST AGE 40-64: CPT | Performed by: FAMILY MEDICINE

## 2023-02-22 RX ORDER — ALPRAZOLAM 0.25 MG/1
0.25 TABLET ORAL EVERY 12 HOURS PRN
Qty: 30 TABLET | Refills: 0 | Status: SHIPPED | OUTPATIENT
Start: 2023-02-22

## 2023-02-22 RX ORDER — GABAPENTIN 300 MG/1
300 CAPSULE ORAL 3 TIMES DAILY
Qty: 90 CAPSULE | Refills: 4 | Status: SHIPPED | OUTPATIENT
Start: 2023-02-22

## 2023-02-22 NOTE — PROGRESS NOTES
Subjective   Rashid Maldonado is a 57 y.o. male who presents for annual male wellness exam.  Chief Complaint   Patient presents with   • Annual Exam   • Depression   • Hyperlipidemia   • Pain   Patient doing well in general.  Gabapentin still controls his chronic pain and helps with his mood.  He denies any acute concerns today.  He needs a refill of his alprazolam because the last 30 pills was a year and a half ago.  He still has rare panic attacks.       Sexual History: with wife, no ed   Contraception: na  Diet: standard  Exercise: works   Last dental exam: up to date  Eye exam: overdue    Colon Cancer Screenin  PSA: due      Immunization History   Administered Date(s) Administered   • Pneumococcal Polysaccharide (PPSV23) 2020   • Tdap 2020       The following portions of the patient's history were reviewed and updated as appropriate: allergies, current medications, past family history, past medical history, past social history, past surgical history and problem list.    Past Medical History:   Diagnosis Date   • Abdominal pain, LLQ    • Abnormal findings on diagnostic imaging of abdomen    • Acute deep vein thrombosis of lower extremity ([facility])    • Acute sinusitis    • Anxiety and depression    • Attention deficit disorder    • Atypical depressive disorder    • Bloating    • BMI 40.0-44.9, adult ([facility])    • Chest pain    • Chronic pain syndrome    • Colitis    • COPD (chronic obstructive pulmonary disease) ([facility])    • Cramps, muscle, general    • Dehydration    • Depression    • Diarrhea    • Difficulty breathing    • Disc degeneration, lumbosacral    • DVT (deep venous thrombosis) ([facility])     left leg   • DVT, lower extremity, recurrent ([facility])    • Edema    • Fatigue    • Fever    • GERD (gastroesophageal reflux disease)    • Heartburn    • Hyperlipidemia    • Idiopathic peripheral neuropathy    • Idiopathic urticaria    • Inflamed skin tag    • Insomnia    • Joint pain    • Long-term use of  high-risk medication    • Low back pain    • Lumbago    • Lumbosacral neuritis    • Lymphadenitis    • Melanocytic nevus    • Nausea    • Neoplasm of uncertain behavior of skin    • Neuralgia    • Neuritis    • Organic impotence    • Osteoarthritis, multiple sites    • Panic disorder without agoraphobia    • Prothrombin mutation (HCC)    • Pulmonary embolism (HCC)     bilateral lungs   • Shingles    • Shortness of breath        Past Surgical History:   Procedure Laterality Date   • BACK SURGERY N/A     done at [facility] in [place], [place]   • COLONOSCOPY N/A 2017    Procedure: COLONOSCOPY to Cecum;  Surgeon: Denton Matthews MD;  Location: [facility] ENDOSCOPY;  Service:    • ENDOSCOPY N/A 2017    Procedure: ESOPHAGOGASTRODUODENOSCOPY with Bx's;  Surgeon: Denton Matthews MD;  Location: [facility] ENDOSCOPY;  Service:    • KNEE SURGERY Left     Dr. Gloria   • KNEE SURGERY Right    • OTHER SURGICAL HISTORY      post spinal diskect osteophytect lumb interspace microdiscec       Family History   Problem Relation Age of Onset   • Hypertension Mother    • Lung cancer Mother    • Anxiety disorder Mother    • Depression Mother    • Heart disease Mother    • Leukemia Paternal Uncle    • Diabetes Paternal Grandmother    • Prostate cancer Paternal Grandfather    • Hypothyroidism Father    • Prostate cancer Maternal Grandfather        Social History     Socioeconomic History   • Marital status:      Spouse name: Yasmin   Tobacco Use   • Smoking status: Former     Years: 17.00     Types: Cigarettes     Quit date:      Years since quittin.   • Smokeless tobacco: Never   Substance and Sexual Activity   • Alcohol use: Yes     Comment: Occasionally   • Drug use: No   • Sexual activity: Yes         Current Outpatient Medications:   •  ALPRAZolam (XANAX) 0.25 MG tablet, Take 1 tablet by mouth Every 12 (Twelve) Hours As Needed for Anxiety., Disp: 30 tablet, Rfl: 0  •  aspirin 81 MG chewable tablet, Chew  81 mg Daily., Disp: , Rfl:   •  econazole nitrate (SPECTAZOLE) 1 % cream, Apply 1 application topically to the appropriate area as directed Daily., Disp: 30 g, Rfl: 2  •  Eliquis 5 MG tablet tablet, TAKE ONE TABLET BY MOUTH EVERY 12 HOURS, Disp: 60 tablet, Rfl: 2  •  FIBER PO, Take 1 capsule by mouth As Needed., Disp: , Rfl:   •  fluticasone (FLONASE) 50 MCG/ACT nasal spray, 1 spray into the nostril(s) as directed by provider Daily. (Patient taking differently: 1 spray into the nostril(s) as directed by provider As Needed.), Disp: 18.2 mL, Rfl: 2  •  gabapentin (NEURONTIN) 300 MG capsule, Take 1 capsule by mouth 3 (Three) Times a Day., Disp: 90 capsule, Rfl: 4  •  montelukast (Singulair) 10 MG tablet, Take 1 tablet by mouth Every Night., Disp: 30 tablet, Rfl: 1  •  Omega-3 Fatty Acids (OMEGA 3 PO), Take 1 capsule by mouth Daily., Disp: , Rfl:   •  Zinc Acetate, Oral, (ZINC ACETATE PO), Take  by mouth., Disp: , Rfl:     Review of Systems    Objective   Vitals:    02/22/23 1003   BP: 122/64   Pulse: 64   Temp: 99.1 °F (37.3 °C)   SpO2: 99%     Body mass index is 42.07 kg/m².  Physical Exam  Vitals and nursing note reviewed.   Constitutional:       General: He is not in acute distress.     Appearance: Normal appearance. He is well-developed. He is obese.   HENT:      Head: Normocephalic and atraumatic.      Right Ear: Tympanic membrane, ear canal and external ear normal.      Left Ear: Tympanic membrane, ear canal and external ear normal.      Nose: Nose normal.      Mouth/Throat:      Mouth: Mucous membranes are moist.      Pharynx: Oropharynx is clear.   Eyes:      Conjunctiva/sclera: Conjunctivae normal.      Pupils: Pupils are equal, round, and reactive to light.   Neck:      Thyroid: No thyromegaly.   Cardiovascular:      Rate and Rhythm: Normal rate and regular rhythm.      Heart sounds: No murmur heard.  Pulmonary:      Effort: Pulmonary effort is normal.      Breath sounds: Normal breath sounds.   Abdominal:       General: Abdomen is flat. Bowel sounds are normal. There is no distension.      Palpations: Abdomen is soft. There is no mass.   Musculoskeletal:         General: No swelling or tenderness. Normal range of motion.      Cervical back: Neck supple.   Skin:     General: Skin is warm and dry.      Capillary Refill: Capillary refill takes less than 2 seconds.   Neurological:      Mental Status: He is alert and oriented to person, place, and time.   Psychiatric:         Mood and Affect: Mood normal.         Behavior: Behavior normal.           Assessment & Plan   Diagnoses and all orders for this visit:    1. Encounter for annual general medical examination with abnormal findings in adult (Primary)    2. Hyperlipidemia, unspecified hyperlipidemia type  -     Lipid Panel  -     CBC & Differential  -     Comprehensive Metabolic Panel  -     TSH    3. Hypercoagulable state (HCC)    4. Prediabetes  -     Lipid Panel  -     CBC & Differential  -     Comprehensive Metabolic Panel  -     TSH  -     Hemoglobin A1c    5. Atypical depressive disorder    6. Recurrent deep vein thrombosis (DVT) of lower extremity, unspecified laterality (HCC)    7. Panic disorder without agoraphobia  -     ALPRAZolam (XANAX) 0.25 MG tablet; Take 1 tablet by mouth Every 12 (Twelve) Hours As Needed for Anxiety.  Dispense: 30 tablet; Refill: 0    8. Screening PSA (prostate specific antigen)  -     PSA Screen    9. Chronic pain syndrome  -     gabapentin (NEURONTIN) 300 MG capsule; Take 1 capsule by mouth 3 (Three) Times a Day.  Dispense: 90 capsule; Refill: 4    10. High risk medication use  -     Drug Profile 990005 - Urine, Clean Catch    11. Body mass index (BMI) of 40.0 to 44.9 in adult (HCC)    12. Low serum vitamin B12  -     Vitamin B12    Alberto reviewed, contract renewed and UDS today.  Follow-up in 6 months sooner if needed           Discussed the importance of maintaining a healthy weight and getting regular exercise.  Educated patient on  the benefits of healthy diet.  Advise follow-up annually for wellness exams.    Patient Instructions   Look into Shingles vaccine coverage.

## 2023-02-28 LAB
AMPHETAMINES UR QL SCN: NEGATIVE NG/ML
BARBITURATES UR QL: NEGATIVE NG/ML
BENZODIAZ UR QL SCN: NEGATIVE NG/ML
BZE UR QL: NEGATIVE NG/ML
CANNABINOIDS UR QL CFM: POSITIVE
CREAT UR-MCNC: 68.6 MG/DL (ref 20–300)
ETHANOL UR-MCNC: NEGATIVE %
MEPERIDINE UR QL: NEGATIVE NG/ML
METHADONE UR QL: NEGATIVE NG/ML
OPIATES UR QL SCN: NEGATIVE NG/ML
OXYCODONE+OXYMORPHONE UR QL SCN: NEGATIVE NG/ML
PCP UR QL: NEGATIVE NG/ML
PROPOXYPH UR QL: NEGATIVE NG/ML
TRAMADOL UR QL SCN: NEGATIVE NG/ML

## 2023-06-19 ENCOUNTER — TELEPHONE (OUTPATIENT)
Dept: FAMILY MEDICINE CLINIC | Facility: CLINIC | Age: 58
End: 2023-06-19
Payer: COMMERCIAL

## 2023-07-24 RX ORDER — APIXABAN 5 MG/1
TABLET, FILM COATED ORAL
Qty: 60 TABLET | Refills: 0 | Status: SHIPPED | OUTPATIENT
Start: 2023-07-24

## 2023-08-29 DIAGNOSIS — G89.4 CHRONIC PAIN SYNDROME: ICD-10-CM

## 2023-08-30 RX ORDER — GABAPENTIN 300 MG/1
CAPSULE ORAL
Qty: 90 CAPSULE | Refills: 0 | Status: SHIPPED | OUTPATIENT
Start: 2023-08-30

## 2023-09-01 NOTE — TELEPHONE ENCOUNTER
"  Caller: Rashid Maldonado \"CANDIDO\"    Relationship: Self    Best call back number: [phone]     Requested Prescriptions:   Requested Prescriptions     Pending Prescriptions Disp Refills    apixaban (Eliquis) 5 MG tablet tablet 60 tablet 0     Sig: Take 1 tablet by mouth Every 12 (Twelve) Hours.        Pharmacy where request should be sent: [facility] [id number] [address] - [phone] [facility] [phone] FX     Last office visit with prescribing clinician: 2/22/2023   Last telemedicine visit with prescribing clinician: Visit date not found   Next office visit with prescribing clinician: 9/7/2023     Additional details provided by patient: PATIENT STATED HE WILL BE OUT AFTER TOMORROW. PLEASE ADVISE.     Does the patient have less than a 3 day supply:  [x] Yes  [] No    Would you like a call back once the refill request has been completed: [] Yes [x] No    If the office needs to give you a call back, can they leave a voicemail: [] Yes [x] No    Arcelia Richards Rep   09/01/23 09:00 EDT       "

## 2023-09-07 ENCOUNTER — OFFICE VISIT (OUTPATIENT)
Dept: FAMILY MEDICINE CLINIC | Facility: CLINIC | Age: 58
End: 2023-09-07
Payer: COMMERCIAL

## 2023-09-07 VITALS
WEIGHT: 298.6 LBS | HEART RATE: 74 BPM | DIASTOLIC BLOOD PRESSURE: 78 MMHG | HEIGHT: 70 IN | TEMPERATURE: 97.8 F | OXYGEN SATURATION: 98 % | SYSTOLIC BLOOD PRESSURE: 142 MMHG | BODY MASS INDEX: 42.75 KG/M2

## 2023-09-07 DIAGNOSIS — J30.1 SEASONAL ALLERGIC RHINITIS DUE TO POLLEN: ICD-10-CM

## 2023-09-07 DIAGNOSIS — R03.0 ELEVATED BLOOD PRESSURE READING: ICD-10-CM

## 2023-09-07 DIAGNOSIS — E53.8 LOW SERUM VITAMIN B12: ICD-10-CM

## 2023-09-07 DIAGNOSIS — R00.1 SINUS BRADYCARDIA: ICD-10-CM

## 2023-09-07 DIAGNOSIS — R73.03 PREDIABETES: ICD-10-CM

## 2023-09-07 DIAGNOSIS — E78.5 HYPERLIPIDEMIA, UNSPECIFIED HYPERLIPIDEMIA TYPE: Primary | ICD-10-CM

## 2023-09-07 DIAGNOSIS — I87.8 VENOUS STASIS: ICD-10-CM

## 2023-09-07 DIAGNOSIS — Z79.899 HIGH RISK MEDICATION USE: ICD-10-CM

## 2023-09-07 DIAGNOSIS — D68.59 HYPERCOAGULABLE STATE: ICD-10-CM

## 2023-09-07 DIAGNOSIS — I82.409 RECURRENT DEEP VEIN THROMBOSIS (DVT) OF LOWER EXTREMITY, UNSPECIFIED LATERALITY: ICD-10-CM

## 2023-09-07 DIAGNOSIS — R07.89 OTHER CHEST PAIN: ICD-10-CM

## 2023-09-07 DIAGNOSIS — G89.4 CHRONIC PAIN SYNDROME: ICD-10-CM

## 2023-09-07 DIAGNOSIS — F32.89 ATYPICAL DEPRESSIVE DISORDER: ICD-10-CM

## 2023-09-07 DIAGNOSIS — R06.02 SHORTNESS OF BREATH: ICD-10-CM

## 2023-09-07 RX ORDER — GABAPENTIN 300 MG/1
300 CAPSULE ORAL 3 TIMES DAILY
Qty: 270 CAPSULE | Refills: 1 | Status: SHIPPED | OUTPATIENT
Start: 2023-09-07

## 2023-09-07 RX ORDER — MONTELUKAST SODIUM 10 MG/1
10 TABLET ORAL NIGHTLY
Qty: 90 TABLET | Refills: 1 | Status: SHIPPED | OUTPATIENT
Start: 2023-09-07

## 2023-09-07 RX ORDER — MULTIVIT WITH MINERALS/LUTEIN
250 TABLET ORAL DAILY
COMMUNITY

## 2023-09-07 RX ORDER — MULTIPLE VITAMINS W/ MINERALS TAB 9MG-400MCG
1 TAB ORAL DAILY
COMMUNITY

## 2023-09-07 NOTE — PROGRESS NOTES
"Chief Complaint  Med Refill, Depression, Hyperlipidemia, Leg Pain, Pain, and Fatigue    Subjective        Rashid Maldonado presents to [facility]  History of Present Illness  Patient presents for routine follow-up on multiple medical problems.  His gabapentin still helps his chronic pain in his mood.  He does not need a refill on his alprazolam today.  Has been tried.  Fatigues very easily.  Gets winded.  Swelling in legs.  More on the left where he had blood clots.  Has been under a lot of stress.  Wife with him and is concerned.   Has had some sharp chest pains and pressure at times.  Worse with strenuous activity.        Objective   Vital Signs:  /78   Pulse 74   Temp 97.8 °F (36.6 °C)   Ht 177.8 cm (70\")   Wt 135 kg (298 lb 9.6 oz)   SpO2 98%   BMI 42.84 kg/m²   Estimated body mass index is 42.84 kg/m² as calculated from the following:    Height as of this encounter: 177.8 cm (70\").    Weight as of this encounter: 135 kg (298 lb 9.6 oz).             Physical Exam  Constitutional:       General: He is not in acute distress.     Appearance: He is well-developed. He is obese.   HENT:      Head: Normocephalic and atraumatic.      Right Ear: Tympanic membrane, ear canal and external ear normal.      Left Ear: Tympanic membrane, ear canal and external ear normal.      Nose: Nose normal.      Mouth/Throat:      Mouth: Mucous membranes are moist.      Pharynx: Oropharynx is clear.   Eyes:      Conjunctiva/sclera: Conjunctivae normal.      Pupils: Pupils are equal, round, and reactive to light.   Cardiovascular:      Rate and Rhythm: Normal rate and regular rhythm.      Heart sounds: No murmur heard.  Pulmonary:      Effort: Pulmonary effort is normal. No respiratory distress.      Breath sounds: Normal breath sounds.   Musculoskeletal:      Cervical back: Neck supple.      Right lower leg: No edema.      Left lower leg: No edema.   Lymphadenopathy:      Cervical: No cervical " adenopathy.   Skin:     Comments: Venous stasis changes bilateral lower extremities with varicosities on the left lower extremity   Neurological:      Mental Status: He is alert and oriented to person, place, and time.   Psychiatric:         Mood and Affect: Mood normal.         Behavior: Behavior normal.      Result Review :              ECG 12 Lead    Date/Time: 9/7/2023 9:49 AM  Performed by: Kehrer, Meredith Lea, MD  Authorized by: Kehrer, Meredith Lea, MD   Comparison: not compared with previous ECG   Previous ECG: no previous ECG available  Rhythm: sinus bradycardia  Rate: bradycardic  BPM: 55  ST Segments: ST segments normal  T Waves: T waves normal  QRS axis: normal    Clinical impression: abnormal EKG          Assessment and Plan   Diagnoses and all orders for this visit:    1. Hyperlipidemia, unspecified hyperlipidemia type (Primary)  -     Comprehensive Metabolic Panel  -     Lipid Panel    2. Prediabetes  -     Hemoglobin A1c    3. Atypical depressive disorder    4. Hypercoagulable state  -     apixaban (Eliquis) 5 MG tablet tablet; Take 1 tablet by mouth Every 12 (Twelve) Hours.  Dispense: 180 tablet; Refill: 1    5. Recurrent deep vein thrombosis (DVT) of lower extremity, unspecified laterality    6. High risk medication use  -     Drug Profile [id number] - Urine, Clean Catch    7. Sinus bradycardia  -     Holter monitor - 24 hour; Future  -     Adult Transthoracic Echo Complete W/ Cont if Necessary Per Protocol; Future    8. Other chest pain  -     CBC & Differential  -     Comprehensive Metabolic Panel  -     BNP  -     Lipid Panel  -     TSH  -     ECG 12 Lead    9. Shortness of breath  -     CBC & Differential  -     Comprehensive Metabolic Panel  -     BNP  -     Lipid Panel  -     TSH  -     Holter monitor - 24 hour; Future  -     Adult Transthoracic Echo Complete W/ Cont if Necessary Per Protocol; Future  -     ECG 12 Lead    10. Low serum vitamin B12  -     Vitamin B12    11. Elevated blood  pressure reading  -     CBC & Differential  -     Comprehensive Metabolic Panel    12. Seasonal allergic rhinitis due to pollen  -     montelukast (Singulair) 10 MG tablet; Take 1 tablet by mouth Every Night.  Dispense: 90 tablet; Refill: 1    13. Venous stasis    14. Chronic pain syndrome  -     gabapentin (NEURONTIN) 300 MG capsule; Take 1 capsule by mouth 3 (Three) Times a Day.  Dispense: 270 capsule; Refill: 1    Routine follow-up and follow-up sooner pending test results.  To ER for significant shortness of breath, chest pain that does not resolve with rest.         Follow Up   Return in about 6 months (around 3/7/2024) for Annual physical.  Patient was given instructions and counseling regarding his condition or for health maintenance advice. Please see specific information pulled into the AVS if appropriate.

## 2023-09-08 LAB
ALBUMIN SERPL-MCNC: 4.3 G/DL (ref 3.8–4.9)
ALBUMIN/GLOB SERPL: 1.7 {RATIO} (ref 1.2–2.2)
ALP SERPL-CCNC: 74 IU/L (ref 44–121)
ALT SERPL-CCNC: 21 IU/L (ref 0–44)
AMPHETAMINES UR QL SCN: NEGATIVE NG/ML
AST SERPL-CCNC: 21 IU/L (ref 0–40)
BARBITURATES UR QL: NEGATIVE NG/ML
BASOPHILS # BLD AUTO: 0 X10E3/UL (ref 0–0.2)
BASOPHILS NFR BLD AUTO: 1 %
BENZODIAZ UR QL SCN: NEGATIVE NG/ML
BILIRUB SERPL-MCNC: 0.5 MG/DL (ref 0–1.2)
BNP SERPL-MCNC: 9.3 PG/ML (ref 0–100)
BUN SERPL-MCNC: 12 MG/DL (ref 6–24)
BUN/CREAT SERPL: 15 (ref 9–20)
BZE UR QL: NEGATIVE NG/ML
CALCIUM SERPL-MCNC: 9.2 MG/DL (ref 8.7–10.2)
CANNABINOIDS UR QL SCN: NEGATIVE NG/ML
CHLORIDE SERPL-SCNC: 101 MMOL/L (ref 96–106)
CHOLEST SERPL-MCNC: 247 MG/DL (ref 100–199)
CO2 SERPL-SCNC: 25 MMOL/L (ref 20–29)
CREAT SERPL-MCNC: 0.81 MG/DL (ref 0.76–1.27)
CREAT UR-MCNC: 105.2 MG/DL (ref 20–300)
EGFRCR SERPLBLD CKD-EPI 2021: 102 ML/MIN/1.73
EOSINOPHIL # BLD AUTO: 0.2 X10E3/UL (ref 0–0.4)
EOSINOPHIL NFR BLD AUTO: 4 %
ERYTHROCYTE [DISTWIDTH] IN BLOOD BY AUTOMATED COUNT: 12.3 % (ref 11.6–15.4)
ETHANOL UR-MCNC: NEGATIVE %
GLOBULIN SER CALC-MCNC: 2.6 G/DL (ref 1.5–4.5)
GLUCOSE SERPL-MCNC: 99 MG/DL (ref 70–99)
HBA1C MFR BLD: 6.1 % (ref 4.8–5.6)
HCT VFR BLD AUTO: 46.9 % (ref 37.5–51)
HDLC SERPL-MCNC: 47 MG/DL
HGB BLD-MCNC: 15.7 G/DL (ref 13–17.7)
IMM GRANULOCYTES # BLD AUTO: 0 X10E3/UL (ref 0–0.1)
IMM GRANULOCYTES NFR BLD AUTO: 0 %
LDLC SERPL CALC-MCNC: 181 MG/DL (ref 0–99)
LYMPHOCYTES # BLD AUTO: 1.8 X10E3/UL (ref 0.7–3.1)
LYMPHOCYTES NFR BLD AUTO: 29 %
MCH RBC QN AUTO: 31 PG (ref 26.6–33)
MCHC RBC AUTO-ENTMCNC: 33.5 G/DL (ref 31.5–35.7)
MCV RBC AUTO: 93 FL (ref 79–97)
MEPERIDINE UR QL: NEGATIVE NG/ML
METHADONE UR QL: NEGATIVE NG/ML
MONOCYTES # BLD AUTO: 0.5 X10E3/UL (ref 0.1–0.9)
MONOCYTES NFR BLD AUTO: 9 %
NEUTROPHILS # BLD AUTO: 3.5 X10E3/UL (ref 1.4–7)
NEUTROPHILS NFR BLD AUTO: 57 %
OPIATES UR QL SCN: NEGATIVE NG/ML
OXYCODONE+OXYMORPHONE UR QL SCN: NEGATIVE NG/ML
PCP UR QL: NEGATIVE NG/ML
PLATELET # BLD AUTO: 231 X10E3/UL (ref 150–450)
POTASSIUM SERPL-SCNC: 4.6 MMOL/L (ref 3.5–5.2)
PROPOXYPH UR QL: NEGATIVE NG/ML
PROT SERPL-MCNC: 6.9 G/DL (ref 6–8.5)
RBC # BLD AUTO: 5.06 X10E6/UL (ref 4.14–5.8)
SODIUM SERPL-SCNC: 141 MMOL/L (ref 134–144)
TRAMADOL UR QL SCN: NEGATIVE NG/ML
TRIGL SERPL-MCNC: 109 MG/DL (ref 0–149)
TSH SERPL DL<=0.005 MIU/L-ACNC: 2.17 UIU/ML (ref 0.45–4.5)
VIT B12 SERPL-MCNC: 392 PG/ML (ref 232–1245)
VLDLC SERPL CALC-MCNC: 19 MG/DL (ref 5–40)
WBC # BLD AUTO: 6.1 X10E3/UL (ref 3.4–10.8)

## 2023-09-27 DIAGNOSIS — R00.1 SINUS BRADYCARDIA: ICD-10-CM

## 2023-09-27 DIAGNOSIS — R06.02 SHORTNESS OF BREATH: ICD-10-CM

## 2023-09-28 DIAGNOSIS — D68.59 HYPERCOAGULABLE STATE: ICD-10-CM

## 2023-09-28 RX ORDER — APIXABAN 5 MG/1
TABLET, FILM COATED ORAL
Qty: 60 TABLET | Refills: 5 | Status: SHIPPED | OUTPATIENT
Start: 2023-09-28

## 2023-10-02 DIAGNOSIS — R06.02 SHORTNESS OF BREATH: ICD-10-CM

## 2023-10-02 DIAGNOSIS — R00.1 SINUS BRADYCARDIA: ICD-10-CM

## 2023-12-12 ENCOUNTER — TELEPHONE (OUTPATIENT)
Dept: FAMILY MEDICINE CLINIC | Facility: CLINIC | Age: 58
End: 2023-12-12

## 2023-12-12 NOTE — TELEPHONE ENCOUNTER
"    Hub staff attempted to follow warm transfer process and was unsuccessful     Caller: Rashid Maldonado \"CANDIDO\"    Relationship to patient: Self    Best call back number: [phone]     Patient is needing: PATIENT IS RETURNING A MISSED CALL. HE IS NOT SURE WHO CALLED HIM             "

## 2024-03-07 ENCOUNTER — OFFICE VISIT (OUTPATIENT)
Dept: FAMILY MEDICINE CLINIC | Facility: CLINIC | Age: 59
End: 2024-03-07
Payer: COMMERCIAL

## 2024-03-07 VITALS
SYSTOLIC BLOOD PRESSURE: 142 MMHG | WEIGHT: 300 LBS | HEIGHT: 70 IN | OXYGEN SATURATION: 97 % | HEART RATE: 67 BPM | DIASTOLIC BLOOD PRESSURE: 78 MMHG | BODY MASS INDEX: 42.95 KG/M2 | TEMPERATURE: 96.4 F

## 2024-03-07 DIAGNOSIS — E78.5 HYPERLIPIDEMIA, UNSPECIFIED HYPERLIPIDEMIA TYPE: ICD-10-CM

## 2024-03-07 DIAGNOSIS — Z79.899 HIGH RISK MEDICATION USE: ICD-10-CM

## 2024-03-07 DIAGNOSIS — G89.4 CHRONIC PAIN SYNDROME: ICD-10-CM

## 2024-03-07 DIAGNOSIS — Z12.5 SCREENING PSA (PROSTATE SPECIFIC ANTIGEN): ICD-10-CM

## 2024-03-07 DIAGNOSIS — E53.8 LOW SERUM VITAMIN B12: ICD-10-CM

## 2024-03-07 DIAGNOSIS — F32.89 ATYPICAL DEPRESSIVE DISORDER: ICD-10-CM

## 2024-03-07 DIAGNOSIS — I87.2 VENOUS INSUFFICIENCY OF BOTH LOWER EXTREMITIES: ICD-10-CM

## 2024-03-07 DIAGNOSIS — I82.409 RECURRENT DEEP VEIN THROMBOSIS (DVT) OF LOWER EXTREMITY, UNSPECIFIED LATERALITY: ICD-10-CM

## 2024-03-07 DIAGNOSIS — Z00.01 ENCOUNTER FOR ANNUAL GENERAL MEDICAL EXAMINATION WITH ABNORMAL FINDINGS IN ADULT: Primary | ICD-10-CM

## 2024-03-07 DIAGNOSIS — R73.03 PREDIABETES: ICD-10-CM

## 2024-03-07 RX ORDER — GABAPENTIN 300 MG/1
300 CAPSULE ORAL 3 TIMES DAILY
Qty: 270 CAPSULE | Refills: 1 | Status: SHIPPED | OUTPATIENT
Start: 2024-03-07

## 2024-03-07 NOTE — PROGRESS NOTES
Subjective   Rashid Maldonado is a 58 y.o. male who presents for annual male wellness exam.  Chief Complaint   Patient presents with    Annual Exam   Presents for his annual as well as follow-up on his dyslipidemia, prediabetes, chronic pain, mood disorder and anxiety.  He does well on his gabapentin.  His alprazolam last him a whole year at 30 tablets.  He knows he needs to do better on his diet but he has terrible food and sugar cravings.  He admits to being an emotional eater.  Gets dizzy sometimes when he bends over.  Gets tired at the end of the day.   He is concerned about some discoloration on his legs.  He has had history of DVTs and venous insufficiency.  Sexual History: some ED   Contraception: na  Diet: standard  Exercise: gardening  Last dental exam: up to date  Eye exam: due for dental    Colon Cancer Screenin-negative  PSA: due      Immunization History   Administered Date(s) Administered    Pneumococcal Polysaccharide (PPSV23) 2020    Tdap 2020       The following portions of the patient's history were reviewed and updated as appropriate: allergies, current medications, past family history, past medical history, past social history, past surgical history and problem list.    Past Medical History:   Diagnosis Date    Abdominal pain, LLQ     Abnormal findings on diagnostic imaging of abdomen     Acute deep vein thrombosis of lower extremity     Acute sinusitis     Anxiety and depression     Attention deficit disorder     Atypical depressive disorder     Bloating     BMI 40.0-44.9, adult     Chest pain     Chronic pain syndrome     Colitis     COPD (chronic obstructive pulmonary disease)     Cramps, muscle, general     Dehydration     Depression     Diarrhea     Difficulty breathing     Disc degeneration, lumbosacral     DVT (deep venous thrombosis)     left leg    DVT, lower extremity, recurrent     Edema     Fatigue     Fever     GERD (gastroesophageal reflux disease)     Heartburn      Hyperlipidemia     Idiopathic peripheral neuropathy     Idiopathic urticaria     Inflamed skin tag     Insomnia     Joint pain     Long-term use of high-risk medication     Low back pain     Lumbago     Lumbosacral neuritis     Lymphadenitis     Melanocytic nevus     Nausea     Neoplasm of uncertain behavior of skin     Neuralgia     Neuritis     Organic impotence     Osteoarthritis, multiple sites     Panic disorder without agoraphobia     Prothrombin mutation     Pulmonary embolism     bilateral lungs    Shingles     Shortness of breath        Past Surgical History:   Procedure Laterality Date    BACK SURGERY N/A     done at [facility] in [place], [place]    COLONOSCOPY N/A 2017    Procedure: COLONOSCOPY to Cecum;  Surgeon: Denton Matthews MD;  Location: [facility] ENDOSCOPY;  Service:     ENDOSCOPY N/A 2017    Procedure: ESOPHAGOGASTRODUODENOSCOPY with Bx's;  Surgeon: Denton Matthews MD;  Location: [facility] ENDOSCOPY;  Service:     KNEE SURGERY Left 1985    Dr. Gloria    KNEE SURGERY Right     OTHER SURGICAL HISTORY      post spinal diskect osteophytect lumb interspace microdiscec       Family History   Problem Relation Age of Onset    Hypertension Mother     Lung cancer Mother     Anxiety disorder Mother     Depression Mother     Heart disease Mother     Leukemia Paternal Uncle     Diabetes Paternal Grandmother     Prostate cancer Paternal Grandfather     Hypothyroidism Father     Prostate cancer Maternal Grandfather        Social History     Socioeconomic History    Marital status:      Spouse name: Yasmin   Tobacco Use    Smoking status: Former     Current packs/day: 0.00     Types: Cigarettes     Start date:      Quit date:      Years since quittin.    Smokeless tobacco: Never   Vaping Use    Vaping status: Former   Substance and Sexual Activity    Alcohol use: Yes     Comment: Occasionally    Drug use: No    Sexual activity: Yes         Current Outpatient Medications:      ALPRAZolam (XANAX) 0.25 MG tablet, Take 1 tablet by mouth Every 12 (Twelve) Hours As Needed for Anxiety., Disp: 30 tablet, Rfl: 0    apixaban (Eliquis) 5 MG tablet tablet, TAKE ONE TABLET BY MOUTH EVERY 12 HOURS, Disp: 60 tablet, Rfl: 5    aspirin 81 MG chewable tablet, Chew 1 tablet Daily., Disp: , Rfl:     Cyanocobalamin (VITAMIN B 12 PO), Take  by mouth., Disp: , Rfl:     econazole nitrate (SPECTAZOLE) 1 % cream, Apply 1 application topically to the appropriate area as directed Daily., Disp: 30 g, Rfl: 2    ELDERBERRY PO, Take  by mouth., Disp: , Rfl:     FIBER PO, Take 1 capsule by mouth As Needed., Disp: , Rfl:     fluticasone (FLONASE) 50 MCG/ACT nasal spray, 1 spray into the nostril(s) as directed by provider Daily. (Patient taking differently: 1 spray into the nostril(s) as directed by provider As Needed.), Disp: 18.2 mL, Rfl: 2    gabapentin (NEURONTIN) 300 MG capsule, Take 1 capsule by mouth 3 (Three) Times a Day., Disp: 270 capsule, Rfl: 1    montelukast (Singulair) 10 MG tablet, Take 1 tablet by mouth Every Night., Disp: 90 tablet, Rfl: 1    multivitamin with minerals tablet tablet, Take 1 tablet by mouth Daily., Disp: , Rfl:     Omega-3 Fatty Acids (OMEGA 3 PO), Take 1 capsule by mouth Daily., Disp: , Rfl:     Turmeric (QC TUMERIC COMPLEX PO), Take  by mouth., Disp: , Rfl:     vitamin C (ASCORBIC ACID) 250 MG tablet, Take 1 tablet by mouth Daily., Disp: , Rfl:     Zinc Acetate, Oral, (ZINC ACETATE PO), Take  by mouth., Disp: , Rfl:     Review of Systems   Constitutional:  Positive for fatigue. Negative for chills and fever.   HENT:  Negative for congestion, ear pain, rhinorrhea and sore throat.    Eyes:  Negative for pain and redness.   Respiratory:  Negative for cough, chest tightness and wheezing.    Cardiovascular:  Positive for leg swelling. Negative for chest pain.   Gastrointestinal:  Negative for abdominal pain, constipation, diarrhea, nausea and vomiting.   Endocrine: Negative for polydipsia and  polyphagia.   Genitourinary:  Negative for dysuria and hematuria.   Musculoskeletal:  Negative for arthralgias and myalgias.   Skin:  Negative for color change and rash.   Allergic/Immunologic: Negative.    Neurological:  Negative for dizziness, weakness, light-headedness and headaches.   Hematological: Negative.    Psychiatric/Behavioral:  Negative for confusion, dysphoric mood and sleep disturbance.        Objective   Vitals:    03/07/24 1008   BP: 142/78   Pulse: 67   Temp: 96.4 °F (35.8 °C)   SpO2: 97%     Body mass index is 43.05 kg/m².  Physical Exam  Vitals and nursing note reviewed.   Constitutional:       General: He is not in acute distress.     Appearance: Normal appearance. He is well-developed. He is obese.   HENT:      Head: Normocephalic and atraumatic.      Right Ear: Tympanic membrane, ear canal and external ear normal.      Left Ear: Tympanic membrane, ear canal and external ear normal.      Nose: Nose normal.      Mouth/Throat:      Mouth: Mucous membranes are moist.      Pharynx: Oropharynx is clear.   Eyes:      Conjunctiva/sclera: Conjunctivae normal.      Pupils: Pupils are equal, round, and reactive to light.   Neck:      Thyroid: No thyromegaly.   Cardiovascular:      Rate and Rhythm: Normal rate and regular rhythm.      Heart sounds: No murmur heard.  Pulmonary:      Effort: Pulmonary effort is normal.      Breath sounds: Normal breath sounds.   Abdominal:      General: Abdomen is flat. Bowel sounds are normal. There is no distension.      Palpations: Abdomen is soft. There is no mass.   Musculoskeletal:         General: No swelling or tenderness. Normal range of motion.      Cervical back: Neck supple.      Right lower leg: Edema present.      Left lower leg: Edema present.   Skin:     General: Skin is warm and dry.      Capillary Refill: Capillary refill takes less than 2 seconds.      Findings: Erythema present.      Comments: Slight hemosiderin deposits around varicosities on lower  extremities with edema   Neurological:      Mental Status: He is alert and oriented to person, place, and time.   Psychiatric:         Mood and Affect: Mood normal.         Behavior: Behavior normal.       Drug Profile [id number] - Urine, Clean Catch (09/07/2023 10:06)   CONTROLLED SUBSTANCE AGREEMENT - SCAN - CONTROLLED SUBSTANCE FORM (02/22/2023)   Assessment & Plan   Diagnoses and all orders for this visit:    1. Encounter for annual general medical examination with abnormal findings in adult (Primary)  -     CBC & Differential; Future  -     Comprehensive Metabolic Panel; Future  -     Lipid Panel; Future  -     TSH; Future    2. Hyperlipidemia, unspecified hyperlipidemia type  -     CBC & Differential; Future  -     Comprehensive Metabolic Panel; Future  -     Hemoglobin A1c; Future  -     Lipid Panel; Future  -     TSH; Future    3. Prediabetes  -     Hemoglobin A1c; Future    4. Atypical depressive disorder    5. Recurrent deep vein thrombosis (DVT) of lower extremity, unspecified laterality    6. High risk medication use  -     Drug Profile [id number] - Urine, Clean Catch    7. Low serum vitamin B12  -     Vitamin B12; Future    8. Screening PSA (prostate specific antigen)  -     PSA Screen; Future    9. Venous insufficiency of both lower extremities    10. Chronic pain syndrome  -     gabapentin (NEURONTIN) 300 MG capsule; Take 1 capsule by mouth 3 (Three) Times a Day.  Dispense: 270 capsule; Refill: 1    Hyperlipidemia-check labs  Prediabetes-due for A1c  Atypical depression-continue the gabapentin for that and his pain  High risk medication use-contract filled out for alprazolam and gabapentin, UDS today  Health maintenance-due for PSA           Discussed the importance of maintaining a healthy weight and getting regular exercise.  Educated patient on the benefits of healthy diet.  Advise follow-up annually for wellness exams.    There are no Patient Instructions on file for this visit.

## 2024-03-08 DIAGNOSIS — Z00.01 ENCOUNTER FOR ANNUAL GENERAL MEDICAL EXAMINATION WITH ABNORMAL FINDINGS IN ADULT: ICD-10-CM

## 2024-03-08 DIAGNOSIS — R73.03 PREDIABETES: ICD-10-CM

## 2024-03-08 DIAGNOSIS — E78.5 HYPERLIPIDEMIA, UNSPECIFIED HYPERLIPIDEMIA TYPE: ICD-10-CM

## 2024-03-08 DIAGNOSIS — Z12.5 SCREENING PSA (PROSTATE SPECIFIC ANTIGEN): ICD-10-CM

## 2024-03-08 DIAGNOSIS — E53.8 LOW SERUM VITAMIN B12: ICD-10-CM

## 2024-03-09 LAB
ALBUMIN SERPL-MCNC: 4.1 G/DL (ref 3.8–4.9)
ALBUMIN/GLOB SERPL: 1.5 {RATIO} (ref 1.2–2.2)
ALP SERPL-CCNC: 76 IU/L (ref 44–121)
ALT SERPL-CCNC: 18 IU/L (ref 0–44)
AST SERPL-CCNC: 19 IU/L (ref 0–40)
BASOPHILS # BLD AUTO: 0 X10E3/UL (ref 0–0.2)
BASOPHILS NFR BLD AUTO: 1 %
BILIRUB SERPL-MCNC: 0.2 MG/DL (ref 0–1.2)
BUN SERPL-MCNC: 14 MG/DL (ref 6–24)
BUN/CREAT SERPL: 17 (ref 9–20)
CALCIUM SERPL-MCNC: 9 MG/DL (ref 8.7–10.2)
CHLORIDE SERPL-SCNC: 102 MMOL/L (ref 96–106)
CHOLEST SERPL-MCNC: 216 MG/DL (ref 100–199)
CO2 SERPL-SCNC: 20 MMOL/L (ref 20–29)
CREAT SERPL-MCNC: 0.81 MG/DL (ref 0.76–1.27)
EGFRCR SERPLBLD CKD-EPI 2021: 102 ML/MIN/1.73
EOSINOPHIL # BLD AUTO: 0.2 X10E3/UL (ref 0–0.4)
EOSINOPHIL NFR BLD AUTO: 4 %
ERYTHROCYTE [DISTWIDTH] IN BLOOD BY AUTOMATED COUNT: 12.1 % (ref 11.6–15.4)
GLOBULIN SER CALC-MCNC: 2.8 G/DL (ref 1.5–4.5)
GLUCOSE SERPL-MCNC: 115 MG/DL (ref 70–99)
HBA1C MFR BLD: 6.1 % (ref 4.8–5.6)
HCT VFR BLD AUTO: 46.3 % (ref 37.5–51)
HDLC SERPL-MCNC: 41 MG/DL
HGB BLD-MCNC: 15.5 G/DL (ref 13–17.7)
IMM GRANULOCYTES # BLD AUTO: 0 X10E3/UL (ref 0–0.1)
IMM GRANULOCYTES NFR BLD AUTO: 0 %
LDLC SERPL CALC-MCNC: 158 MG/DL (ref 0–99)
LYMPHOCYTES # BLD AUTO: 1.6 X10E3/UL (ref 0.7–3.1)
LYMPHOCYTES NFR BLD AUTO: 34 %
MCH RBC QN AUTO: 31.1 PG (ref 26.6–33)
MCHC RBC AUTO-ENTMCNC: 33.5 G/DL (ref 31.5–35.7)
MCV RBC AUTO: 93 FL (ref 79–97)
MONOCYTES # BLD AUTO: 0.4 X10E3/UL (ref 0.1–0.9)
MONOCYTES NFR BLD AUTO: 9 %
NEUTROPHILS # BLD AUTO: 2.6 X10E3/UL (ref 1.4–7)
NEUTROPHILS NFR BLD AUTO: 52 %
PLATELET # BLD AUTO: 237 X10E3/UL (ref 150–450)
POTASSIUM SERPL-SCNC: 4.7 MMOL/L (ref 3.5–5.2)
PROT SERPL-MCNC: 6.9 G/DL (ref 6–8.5)
PSA SERPL-MCNC: 1.2 NG/ML (ref 0–4)
RBC # BLD AUTO: 4.99 X10E6/UL (ref 4.14–5.8)
SODIUM SERPL-SCNC: 140 MMOL/L (ref 134–144)
TRIGL SERPL-MCNC: 93 MG/DL (ref 0–149)
TSH SERPL DL<=0.005 MIU/L-ACNC: 2.35 UIU/ML (ref 0.45–4.5)
VIT B12 SERPL-MCNC: 400 PG/ML (ref 232–1245)
VLDLC SERPL CALC-MCNC: 17 MG/DL (ref 5–40)
WBC # BLD AUTO: 4.9 X10E3/UL (ref 3.4–10.8)

## 2024-03-13 LAB
ACCEPTABLE CREAT UR QL: 105.8 MG/DL (ref 20–300)
AMPHETAMINES UR QL SCN: NEGATIVE NG/ML
BARBITURATES UR QL: NEGATIVE NG/ML
BENZODIAZ UR QL SCN: NEGATIVE NG/ML
BZE UR QL: NEGATIVE NG/ML
CANNABINOIDS UR QL CFM: POSITIVE
ETHANOL UR-MCNC: NEGATIVE %
MEPERIDINE UR QL: NEGATIVE NG/ML
METHADONE UR QL: NEGATIVE NG/ML
OPIATES UR QL SCN: NEGATIVE NG/ML
OXYCODONE+OXYMORPHONE UR QL SCN: NEGATIVE NG/ML
PCP UR QL: NEGATIVE NG/ML
PROPOXYPH UR QL: NEGATIVE NG/ML
TRAMADOL UR QL SCN: NEGATIVE NG/ML

## 2024-04-04 DIAGNOSIS — G89.4 CHRONIC PAIN SYNDROME: ICD-10-CM

## 2024-04-04 RX ORDER — GABAPENTIN 300 MG/1
300 CAPSULE ORAL 3 TIMES DAILY
Qty: 90 CAPSULE | Refills: 1 | Status: SHIPPED | OUTPATIENT
Start: 2024-04-04

## 2024-08-10 DIAGNOSIS — G89.4 CHRONIC PAIN SYNDROME: ICD-10-CM

## 2024-08-12 RX ORDER — GABAPENTIN 300 MG/1
300 CAPSULE ORAL 3 TIMES DAILY
Qty: 90 CAPSULE | Refills: 0 | Status: SHIPPED | OUTPATIENT
Start: 2024-08-12

## 2024-08-12 NOTE — TELEPHONE ENCOUNTER
Rx Refill Note  Requested Prescriptions     Pending Prescriptions Disp Refills    gabapentin (NEURONTIN) 300 MG capsule [Pharmacy Med Name: GABAPENTIN 300 MG CAPSULE] 90 capsule      Sig: TAKE 1 CAPSULE BY MOUTH 3 TIMES A DAY      Last office visit with prescribing clinician: 3/7/2024   Last telemedicine visit with prescribing clinician: Visit date not found   Next office visit with prescribing clinician: 9/10/2024                         Would you like a call back once the refill request has been completed: [] Yes [] No    If the office needs to give you a call back, can they leave a voicemail: [] Yes [] No    Jessi Guy MA  08/12/24, 07:35 EDT

## 2024-08-15 ENCOUNTER — OFFICE VISIT (OUTPATIENT)
Dept: FAMILY MEDICINE CLINIC | Facility: CLINIC | Age: 59
End: 2024-08-15
Payer: COMMERCIAL

## 2024-08-15 ENCOUNTER — TELEPHONE (OUTPATIENT)
Dept: FAMILY MEDICINE CLINIC | Facility: CLINIC | Age: 59
End: 2024-08-15

## 2024-08-15 VITALS
OXYGEN SATURATION: 99 % | DIASTOLIC BLOOD PRESSURE: 70 MMHG | HEIGHT: 70 IN | SYSTOLIC BLOOD PRESSURE: 136 MMHG | BODY MASS INDEX: 43.49 KG/M2 | HEART RATE: 74 BPM | WEIGHT: 303.8 LBS | TEMPERATURE: 97.9 F

## 2024-08-15 DIAGNOSIS — R10.84 GENERALIZED ABDOMINAL PAIN: Primary | ICD-10-CM

## 2024-08-15 DIAGNOSIS — M51.36 DDD (DEGENERATIVE DISC DISEASE), LUMBAR: ICD-10-CM

## 2024-08-15 DIAGNOSIS — K52.9 ENTERITIS: ICD-10-CM

## 2024-08-15 DIAGNOSIS — K92.89 GAS BLOAT SYNDROME: ICD-10-CM

## 2024-08-15 PROCEDURE — 99214 OFFICE O/P EST MOD 30 MIN: CPT | Performed by: FAMILY MEDICINE

## 2024-08-15 RX ORDER — AMOXICILLIN AND CLAVULANATE POTASSIUM 875; 125 MG/1; MG/1
1 TABLET, FILM COATED ORAL
COMMUNITY
Start: 2024-08-08

## 2024-08-15 NOTE — PROGRESS NOTES
"Chief Complaint  Diarrhea and food poison    Subjective        Rashid Maldonado presents to [facility]  History of Present Illness  History of Present Illness  The patient is a 59-year-old male who presents for an acute concern.    He reports experiencing illness after consuming seafood, including shrimp and fish, approximately six weeks ago. This was followed by severe diarrhea lasting over two and a half weeks. His symptoms have since evolved to include gas, belching, and abdominal discomfort. Despite avoiding foods known to cause gas, such as beans, his stomach pain persisted. He sought care at an immediate care facility where he was prescribed antibiotics. However, after three days of antibiotic treatment, his diarrhea and stomach pain worsened. He also reported sinus issues, suspecting that sinus drainage might be contributing to his abdominal discomfort. He was given medication for his sinuses, but this resulted in watery, black, green stools. He discontinued the antibiotic and now reports constipation, with his last bowel movement occurring this morning.  His stool has been getting back to normal.  He describes the stool as clumpy and soft. He continues to experience nausea, gas, and bloating. He has not taken any probiotics.    He also mentions a childhood diagnosis of gastrointestinal issues and a \"nervous belly\". He has been largely housebound for the past few weeks due to stress. He has lost 20 pounds and has reduced his food intake. He also reports shortness of breath and chest tightness during physical exertion.    He has severe pain in his legs and is going to have surgery this year. He is interested in getting a nerve block or some type of injection for temporary relief of the pain. He does not want to be on pain medications. He has COPD and breathing issues.       Objective   Vital Signs:  /70   Pulse 74   Temp 97.9 °F (36.6 °C)   Ht 177.8 cm (70\")   Wt (!) 138 " "kg (303 lb 12.8 oz)   SpO2 99%   BMI 43.59 kg/m²   Estimated body mass index is 43.59 kg/m² as calculated from the following:    Height as of this encounter: 177.8 cm (70\").    Weight as of this encounter: 138 kg (303 lb 12.8 oz).               Physical Exam   Physical Exam  Patient is alert, in no acute distress.  Oral mucosa is moist.  Neck is supple.  Lungs are clear.  Heart is regular.  Abdomen is distended, but nontender and soft.       Result Review :          Results                Assessment and Plan     Diagnoses and all orders for this visit:    1. Generalized abdominal pain (Primary)  -     Lipase  -     CBC & Differential  -     Comprehensive Metabolic Panel    2. DDD (degenerative disc disease), lumbar    3. Enteritis  -     Lipase  -     CBC & Differential  -     Comprehensive Metabolic Panel    4. Gas bloat syndrome      Assessment & Plan  1. Generalized anxiety pain following probable gastroenteritis.  He is to start a probiotic, specifically Align, and try yogurt and other cultured foods such as sauerkraut and kefir. Labs will be checked to rule out pancreatitis and hepatitis due to his persistent nausea. It was discussed that it can take a couple of months for the gut to return to normal after a bad gastroenteritis virus.    2. Degenerative disc disease.  Discussed the option of seeing a back specialist. He prefers to wait for further imaging. An MRI will be scheduled to assess the condition before considering surgery. Pain management options such as radiofrequency ablations were discussed, but he is not interested in pain medications.              Follow Up     Return for Next scheduled follow up.  Patient was given instructions and counseling regarding his condition or for health maintenance advice. Please see specific information pulled into the AVS if appropriate.    Patient or patient representative verbalized consent for the use of Ambient Listening during the visit with  Greta Delcid " Kehrer, MD for chart documentation. 8/15/2024  12:25 EDT

## 2024-08-16 LAB
ALBUMIN SERPL-MCNC: 4.2 G/DL (ref 3.5–5.2)
ALBUMIN/GLOB SERPL: 1.6 G/DL
ALP SERPL-CCNC: 73 U/L (ref 39–117)
ALT SERPL-CCNC: 21 U/L (ref 1–41)
AST SERPL-CCNC: 19 U/L (ref 1–40)
BASOPHILS # BLD AUTO: 0.03 10*3/MM3 (ref 0–0.2)
BASOPHILS NFR BLD AUTO: 0.5 % (ref 0–1.5)
BILIRUB SERPL-MCNC: 0.4 MG/DL (ref 0–1.2)
BUN SERPL-MCNC: 13 MG/DL (ref 8–23)
BUN/CREAT SERPL: 15.7 (ref 7–25)
CALCIUM SERPL-MCNC: 9.1 MG/DL (ref 8.6–10.5)
CHLORIDE SERPL-SCNC: 105 MMOL/L (ref 98–107)
CO2 SERPL-SCNC: 30.2 MMOL/L (ref 22–29)
CREAT SERPL-MCNC: 0.83 MG/DL (ref 0.76–1.27)
EGFRCR SERPLBLD CKD-EPI 2021: 100.2 ML/MIN/1.73
EOSINOPHIL # BLD AUTO: 0.33 10*3/MM3 (ref 0–0.4)
EOSINOPHIL NFR BLD AUTO: 5.9 % (ref 0.3–6.2)
ERYTHROCYTE [DISTWIDTH] IN BLOOD BY AUTOMATED COUNT: 11.9 % (ref 12.3–15.4)
GLOBULIN SER CALC-MCNC: 2.7 GM/DL
GLUCOSE SERPL-MCNC: 115 MG/DL (ref 65–99)
HCT VFR BLD AUTO: 44.5 % (ref 37.5–51)
HGB BLD-MCNC: 14.8 G/DL (ref 13–17.7)
IMM GRANULOCYTES # BLD AUTO: 0.02 10*3/MM3 (ref 0–0.05)
IMM GRANULOCYTES NFR BLD AUTO: 0.4 % (ref 0–0.5)
LIPASE SERPL-CCNC: 34 U/L (ref 13–60)
LYMPHOCYTES # BLD AUTO: 1.76 10*3/MM3 (ref 0.7–3.1)
LYMPHOCYTES NFR BLD AUTO: 31.7 % (ref 19.6–45.3)
MCH RBC QN AUTO: 31.2 PG (ref 26.6–33)
MCHC RBC AUTO-ENTMCNC: 33.3 G/DL (ref 31.5–35.7)
MCV RBC AUTO: 93.7 FL (ref 79–97)
MONOCYTES # BLD AUTO: 0.51 10*3/MM3 (ref 0.1–0.9)
MONOCYTES NFR BLD AUTO: 9.2 % (ref 5–12)
NEUTROPHILS # BLD AUTO: 2.9 10*3/MM3 (ref 1.7–7)
NEUTROPHILS NFR BLD AUTO: 52.3 % (ref 42.7–76)
NRBC BLD AUTO-RTO: 0 /100 WBC (ref 0–0.2)
PLATELET # BLD AUTO: 242 10*3/MM3 (ref 140–450)
POTASSIUM SERPL-SCNC: 5.3 MMOL/L (ref 3.5–5.2)
PROT SERPL-MCNC: 6.9 G/DL (ref 6–8.5)
RBC # BLD AUTO: 4.75 10*6/MM3 (ref 4.14–5.8)
SODIUM SERPL-SCNC: 141 MMOL/L (ref 136–145)
VIT B12 SERPL-MCNC: 451 PG/ML (ref 211–946)
WBC # BLD AUTO: 5.55 10*3/MM3 (ref 3.4–10.8)
WRITTEN AUTHORIZATION: NORMAL

## 2024-08-29 ENCOUNTER — CLINICAL SUPPORT (OUTPATIENT)
Dept: FAMILY MEDICINE CLINIC | Facility: CLINIC | Age: 59
End: 2024-08-29
Payer: COMMERCIAL

## 2024-08-29 DIAGNOSIS — E53.8 LOW SERUM VITAMIN B12: Primary | ICD-10-CM

## 2024-08-29 PROCEDURE — 96372 THER/PROPH/DIAG INJ SC/IM: CPT | Performed by: FAMILY MEDICINE

## 2024-08-29 RX ORDER — CYANOCOBALAMIN 1000 UG/ML
1000 INJECTION, SOLUTION INTRAMUSCULAR; SUBCUTANEOUS
Status: SHIPPED | OUTPATIENT
Start: 2024-08-29

## 2024-08-29 RX ADMIN — CYANOCOBALAMIN 1000 MCG: 1000 INJECTION, SOLUTION INTRAMUSCULAR; SUBCUTANEOUS at 13:48

## 2024-09-10 ENCOUNTER — OFFICE VISIT (OUTPATIENT)
Dept: FAMILY MEDICINE CLINIC | Facility: CLINIC | Age: 59
End: 2024-09-10
Payer: COMMERCIAL

## 2024-09-10 VITALS
BODY MASS INDEX: 43.49 KG/M2 | HEART RATE: 66 BPM | SYSTOLIC BLOOD PRESSURE: 138 MMHG | DIASTOLIC BLOOD PRESSURE: 70 MMHG | TEMPERATURE: 96.8 F | WEIGHT: 303.8 LBS | OXYGEN SATURATION: 96 % | HEIGHT: 70 IN

## 2024-09-10 DIAGNOSIS — E53.8 LOW SERUM VITAMIN B12: ICD-10-CM

## 2024-09-10 DIAGNOSIS — R73.03 PREDIABETES: ICD-10-CM

## 2024-09-10 DIAGNOSIS — J30.1 SEASONAL ALLERGIC RHINITIS DUE TO POLLEN: ICD-10-CM

## 2024-09-10 DIAGNOSIS — G89.4 CHRONIC PAIN SYNDROME: Primary | ICD-10-CM

## 2024-09-10 DIAGNOSIS — E87.5 HYPERKALEMIA: ICD-10-CM

## 2024-09-10 DIAGNOSIS — E78.5 HYPERLIPIDEMIA, UNSPECIFIED HYPERLIPIDEMIA TYPE: ICD-10-CM

## 2024-09-10 DIAGNOSIS — M51.36 DDD (DEGENERATIVE DISC DISEASE), LUMBAR: ICD-10-CM

## 2024-09-10 DIAGNOSIS — G89.4 CHRONIC PAIN SYNDROME: ICD-10-CM

## 2024-09-10 DIAGNOSIS — F32.89 ATYPICAL DEPRESSIVE DISORDER: ICD-10-CM

## 2024-09-10 DIAGNOSIS — Z79.899 HIGH RISK MEDICATION USE: ICD-10-CM

## 2024-09-10 PROCEDURE — 99214 OFFICE O/P EST MOD 30 MIN: CPT | Performed by: FAMILY MEDICINE

## 2024-09-10 RX ORDER — MONTELUKAST SODIUM 10 MG/1
10 TABLET ORAL NIGHTLY
Qty: 90 TABLET | Refills: 1 | Status: SHIPPED | OUTPATIENT
Start: 2024-09-10

## 2024-09-10 RX ORDER — GABAPENTIN 300 MG/1
300 CAPSULE ORAL 3 TIMES DAILY
Qty: 90 CAPSULE | Refills: 0 | Status: SHIPPED | OUTPATIENT
Start: 2024-09-10

## 2024-09-10 NOTE — PROGRESS NOTES
"Chief Complaint  Pain and Depression    Subjective        Rashid Maldonado presents to [facility]  History of Present Illness  History of Present Illness  The patient is a 59-year-old male who presents for a routine follow-up on chronic pain, atypical depression, prediabetes, and hyperlipidemia.    He received a B12 injection two weeks ago and has noticed an improvement in his condition. Future injections have been scheduled. He fasted this morning in preparation for a lipid panel. He has been consuming honey excessively and suspects it may be contributing to his gastrointestinal issues. He occasionally consumes a soft drink daily but has largely eliminated them from his diet.    He has been experiencing significant pain, which has limited his physical activity. He used to experience constant knee pain, but this has since subsided. He now experiences numbness, burning, and tingling sensations. His feet feel as if they are on fire with needles. Additionally, he experiences burning, itching, and aching in his lower back. He has been managing his pain with 3 to 4 Aleve tablets daily and occasional ibuprofen. He was previously referred to Dr. Bhagat for x-rays.    He has been taking montelukast for his sinuses and has recently started taking fiber supplements.    He reports no recent thoughts of self-harm or hopelessness.       Objective   Vital Signs:  /70   Pulse 66   Temp 96.8 °F (36 °C)   Ht 177.8 cm (70\")   Wt (!) 138 kg (303 lb 12.8 oz)   SpO2 96%   BMI 43.59 kg/m²   Estimated body mass index is 43.59 kg/m² as calculated from the following:    Height as of this encounter: 177.8 cm (70\").    Weight as of this encounter: 138 kg (303 lb 12.8 oz).               Physical Exam   Physical Exam  Patient is obese in no acute distress.    Oral mucosa is moist.  Clear lungs.  Regular heart.  Trace edema bilateral ankles       Result Review :          Results  Laboratory Studies  B12 " level was at the lower end of normal. High potassium level.              Assessment and Plan     Diagnoses and all orders for this visit:    1. Chronic pain syndrome (Primary)    2. Atypical depressive disorder    3. DDD (degenerative disc disease), lumbar    4. High risk medication use  -     Drug Profile [id number] - Urine, Clean Catch    5. Prediabetes  -     Hemoglobin A1c    6. Hyperlipidemia, unspecified hyperlipidemia type  -     Lipid Panel  -     Comprehensive Metabolic Panel    7. Low serum vitamin B12    8. Hyperkalemia  -     Comprehensive Metabolic Panel    9. Seasonal allergic rhinitis due to pollen  -     montelukast (Singulair) 10 MG tablet; Take 1 tablet by mouth Every Night.  Dispense: 90 tablet; Refill: 1      Assessment & Plan  1. Chronic Pain.  He reports persistent pain, particularly in his lower back, which is burning, itching, and aching. He has been taking ibuprofen intermittently but is advised against taking more than two Aleve tablets per day due to potential kidney damage. Kidney function tests will be conducted today. He is advised to consider a referral to the [facility] for further evaluation and management.  Continue gabapentin.  Contract up-to-date.  Needs UDS today.    2. Atypical Depression.  He reports no recent thoughts of self-harm or hopelessness beyond his usual levels. He is encouraged to continue working on his diet and lifestyle changes to improve his overall mood and well-being.    3. Prediabetes.  A repeat A1c test is necessary to monitor his blood sugar levels. If his A1c levels continue to rise, initiation of GLP-1 therapy may be considered to aid in weight loss and manage his diabetes. He is advised to eliminate refined sugars, inflammatory processed foods, and trans fatty acids from his diet.    4. Hyperlipidemia.  He fasted this morning for a lipid panel, which will be conducted today. He is advised to continue dietary modifications to manage his lipid  levels.    5. High Potassium.  He had a high potassium level recently, the cause of which is currently unknown. Further investigation is needed to determine the underlying cause.    6. Vitamin B12 Deficiency.  His B12 levels are within the normal range but on the lower side. Monthly B12 injections are recommended, and he has scheduled future injections.    7. Sinusitis.  He reports sinus issues and requests a refill for montelukast (Singulair). A prescription for montelukast will be sent to his pharmacy.              Follow Up     Return in about 6 months (around 3/10/2025) for Annual physical.  Patient was given instructions and counseling regarding his condition or for health maintenance advice. Please see specific information pulled into the AVS if appropriate.    Patient or patient representative verbalized consent for the use of Ambient Listening during the visit with  Meredith Lea Kehrer, MD for chart documentation. 9/10/2024  09:30 EDT

## 2024-09-11 LAB
ACCEPTABLE CREAT UR QL: 79.2 MG/DL (ref 20–300)
ALBUMIN SERPL-MCNC: 4.3 G/DL (ref 3.5–5.2)
ALBUMIN/GLOB SERPL: 1.5 G/DL
ALP SERPL-CCNC: 84 U/L (ref 39–117)
ALT SERPL-CCNC: 18 U/L (ref 1–41)
AMPHETAMINES UR QL SCN: NEGATIVE NG/ML
AST SERPL-CCNC: 20 U/L (ref 1–40)
BARBITURATES UR QL: NEGATIVE NG/ML
BENZODIAZ UR QL SCN: NEGATIVE NG/ML
BILIRUB SERPL-MCNC: 0.5 MG/DL (ref 0–1.2)
BUN SERPL-MCNC: 13 MG/DL (ref 8–23)
BUN/CREAT SERPL: 15.5 (ref 7–25)
BZE UR QL: NEGATIVE NG/ML
CALCIUM SERPL-MCNC: 9.7 MG/DL (ref 8.6–10.5)
CANNABINOIDS UR QL SCN: NEGATIVE NG/ML
CHLORIDE SERPL-SCNC: 100 MMOL/L (ref 98–107)
CHOLEST SERPL-MCNC: 222 MG/DL (ref 0–200)
CO2 SERPL-SCNC: 31.2 MMOL/L (ref 22–29)
CREAT SERPL-MCNC: 0.84 MG/DL (ref 0.76–1.27)
EGFRCR SERPLBLD CKD-EPI 2021: 99.8 ML/MIN/1.73
ETHANOL UR-MCNC: NEGATIVE %
GLOBULIN SER CALC-MCNC: 2.8 GM/DL
GLUCOSE SERPL-MCNC: 116 MG/DL (ref 65–99)
HBA1C MFR BLD: 6.2 % (ref 4.8–5.6)
HDLC SERPL-MCNC: 40 MG/DL (ref 40–60)
LDLC SERPL CALC-MCNC: 162 MG/DL (ref 0–100)
MEPERIDINE UR QL: NEGATIVE NG/ML
METHADONE UR QL: NEGATIVE NG/ML
OPIATES UR QL SCN: NEGATIVE NG/ML
OXYCODONE+OXYMORPHONE UR QL SCN: NEGATIVE NG/ML
PCP UR QL: NEGATIVE NG/ML
POTASSIUM SERPL-SCNC: 4.7 MMOL/L (ref 3.5–5.2)
PROPOXYPH UR QL: NEGATIVE NG/ML
PROT SERPL-MCNC: 7.1 G/DL (ref 6–8.5)
SODIUM SERPL-SCNC: 139 MMOL/L (ref 136–145)
TRAMADOL UR QL SCN: NEGATIVE NG/ML
TRIGL SERPL-MCNC: 111 MG/DL (ref 0–150)
VLDLC SERPL CALC-MCNC: 20 MG/DL (ref 5–40)

## 2024-10-03 ENCOUNTER — CLINICAL SUPPORT (OUTPATIENT)
Dept: FAMILY MEDICINE CLINIC | Facility: CLINIC | Age: 59
End: 2024-10-03
Payer: COMMERCIAL

## 2024-10-03 DIAGNOSIS — E53.8 B12 DEFICIENCY: Primary | ICD-10-CM

## 2024-10-03 PROCEDURE — 96372 THER/PROPH/DIAG INJ SC/IM: CPT | Performed by: FAMILY MEDICINE

## 2024-10-03 RX ADMIN — CYANOCOBALAMIN 1000 MCG: 1000 INJECTION, SOLUTION INTRAMUSCULAR; SUBCUTANEOUS at 15:38

## 2024-10-07 DIAGNOSIS — D68.59 HYPERCOAGULABLE STATE: ICD-10-CM

## 2024-10-07 RX ORDER — APIXABAN 5 MG/1
TABLET, FILM COATED ORAL
Qty: 60 TABLET | Refills: 5 | Status: SHIPPED | OUTPATIENT
Start: 2024-10-07

## 2024-10-31 ENCOUNTER — CLINICAL SUPPORT (OUTPATIENT)
Dept: FAMILY MEDICINE CLINIC | Facility: CLINIC | Age: 59
End: 2024-10-31
Payer: COMMERCIAL

## 2024-10-31 DIAGNOSIS — E53.8 B12 DEFICIENCY: Primary | ICD-10-CM

## 2024-10-31 PROCEDURE — 96372 THER/PROPH/DIAG INJ SC/IM: CPT | Performed by: FAMILY MEDICINE

## 2024-10-31 RX ADMIN — CYANOCOBALAMIN 1000 MCG: 1000 INJECTION, SOLUTION INTRAMUSCULAR; SUBCUTANEOUS at 12:51

## 2024-11-10 DIAGNOSIS — G89.4 CHRONIC PAIN SYNDROME: ICD-10-CM

## 2024-11-11 RX ORDER — GABAPENTIN 300 MG/1
300 CAPSULE ORAL 3 TIMES DAILY
Qty: 90 CAPSULE | Refills: 0 | Status: SHIPPED | OUTPATIENT
Start: 2024-11-11

## 2024-11-11 NOTE — TELEPHONE ENCOUNTER
Rx Refill Note  Requested Prescriptions     Pending Prescriptions Disp Refills    gabapentin (NEURONTIN) 300 MG capsule 90 capsule 0     Sig: Take 1 capsule by mouth 3 (Three) Times a Day.      Last office visit with prescribing clinician: 9/10/2024   Last telemedicine visit with prescribing clinician: Visit date not found   Next office visit with prescribing clinician: 3/11/2025                         Would you like a call back once the refill request has been completed: [] Yes [] No    If the office needs to give you a call back, can they leave a voicemail: [] Yes [] No    Sandra Ricks MA  11/11/24, 08:25 EST

## 2024-12-02 ENCOUNTER — CLINICAL SUPPORT (OUTPATIENT)
Dept: FAMILY MEDICINE CLINIC | Facility: CLINIC | Age: 59
End: 2024-12-02
Payer: COMMERCIAL

## 2024-12-02 DIAGNOSIS — E53.8 B12 DEFICIENCY: Primary | ICD-10-CM

## 2024-12-02 PROCEDURE — 96372 THER/PROPH/DIAG INJ SC/IM: CPT | Performed by: FAMILY MEDICINE

## 2024-12-02 RX ADMIN — CYANOCOBALAMIN 1000 MCG: 1000 INJECTION, SOLUTION INTRAMUSCULAR; SUBCUTANEOUS at 11:42

## 2025-01-02 ENCOUNTER — CLINICAL SUPPORT (OUTPATIENT)
Dept: FAMILY MEDICINE CLINIC | Facility: CLINIC | Age: 60
End: 2025-01-02
Payer: COMMERCIAL

## 2025-01-02 DIAGNOSIS — E53.8 B12 DEFICIENCY: Primary | ICD-10-CM

## 2025-01-02 PROCEDURE — 96372 THER/PROPH/DIAG INJ SC/IM: CPT | Performed by: FAMILY MEDICINE

## 2025-01-02 RX ADMIN — CYANOCOBALAMIN 1000 MCG: 1000 INJECTION, SOLUTION INTRAMUSCULAR; SUBCUTANEOUS at 11:42

## 2025-01-12 DIAGNOSIS — G89.4 CHRONIC PAIN SYNDROME: ICD-10-CM

## 2025-01-13 RX ORDER — GABAPENTIN 300 MG/1
300 CAPSULE ORAL 3 TIMES DAILY
Qty: 90 CAPSULE | Refills: 0 | Status: SHIPPED | OUTPATIENT
Start: 2025-01-13

## 2025-01-13 NOTE — TELEPHONE ENCOUNTER
Rx Refill Note  Requested Prescriptions     Pending Prescriptions Disp Refills    gabapentin (NEURONTIN) 300 MG capsule 90 capsule 0     Sig: Take 1 capsule by mouth 3 (Three) Times a Day.      Last office visit with prescribing clinician: 9/10/2024   Last telemedicine visit with prescribing clinician: Visit date not found   Next office visit with prescribing clinician: 3/11/2025                         Would you like a call back once the refill request has been completed: [] Yes [] No    If the office needs to give you a call back, can they leave a voicemail: [] Yes [] No    Sandra Ricks MA  01/13/25, 08:14 EST

## 2025-02-06 ENCOUNTER — CLINICAL SUPPORT (OUTPATIENT)
Dept: FAMILY MEDICINE CLINIC | Facility: CLINIC | Age: 60
End: 2025-02-06
Payer: COMMERCIAL

## 2025-02-06 DIAGNOSIS — E53.8 B12 DEFICIENCY: Primary | ICD-10-CM

## 2025-02-06 PROCEDURE — 96372 THER/PROPH/DIAG INJ SC/IM: CPT | Performed by: FAMILY MEDICINE

## 2025-02-06 RX ADMIN — CYANOCOBALAMIN 1000 MCG: 1000 INJECTION, SOLUTION INTRAMUSCULAR; SUBCUTANEOUS at 11:12

## 2025-03-06 ENCOUNTER — CLINICAL SUPPORT (OUTPATIENT)
Dept: FAMILY MEDICINE CLINIC | Facility: CLINIC | Age: 60
End: 2025-03-06
Payer: COMMERCIAL

## 2025-03-06 DIAGNOSIS — E53.8 B12 DEFICIENCY: Primary | ICD-10-CM

## 2025-03-06 RX ADMIN — CYANOCOBALAMIN 1000 MCG: 1000 INJECTION, SOLUTION INTRAMUSCULAR; SUBCUTANEOUS at 11:05

## 2025-03-11 ENCOUNTER — OFFICE VISIT (OUTPATIENT)
Dept: FAMILY MEDICINE CLINIC | Facility: CLINIC | Age: 60
End: 2025-03-11
Payer: COMMERCIAL

## 2025-03-11 VITALS
BODY MASS INDEX: 43.41 KG/M2 | DIASTOLIC BLOOD PRESSURE: 84 MMHG | HEART RATE: 68 BPM | SYSTOLIC BLOOD PRESSURE: 138 MMHG | OXYGEN SATURATION: 99 % | TEMPERATURE: 98.2 F | WEIGHT: 303.2 LBS | HEIGHT: 70 IN

## 2025-03-11 DIAGNOSIS — Z00.01 ENCOUNTER FOR ANNUAL GENERAL MEDICAL EXAMINATION WITH ABNORMAL FINDINGS IN ADULT: Primary | ICD-10-CM

## 2025-03-11 DIAGNOSIS — E53.8 B12 DEFICIENCY: ICD-10-CM

## 2025-03-11 DIAGNOSIS — D68.59 HYPERCOAGULABLE STATE: ICD-10-CM

## 2025-03-11 DIAGNOSIS — Z79.899 HIGH RISK MEDICATION USE: ICD-10-CM

## 2025-03-11 DIAGNOSIS — G89.4 CHRONIC PAIN SYNDROME: ICD-10-CM

## 2025-03-11 DIAGNOSIS — F32.89 ATYPICAL DEPRESSIVE DISORDER: ICD-10-CM

## 2025-03-11 DIAGNOSIS — E78.5 HYPERLIPIDEMIA, UNSPECIFIED HYPERLIPIDEMIA TYPE: ICD-10-CM

## 2025-03-11 DIAGNOSIS — R73.03 PREDIABETES: ICD-10-CM

## 2025-03-11 DIAGNOSIS — M51.361 DEGENERATION OF INTERVERTEBRAL DISC OF LUMBAR REGION WITH LOWER EXTREMITY PAIN: ICD-10-CM

## 2025-03-11 DIAGNOSIS — Z12.5 SCREENING PSA (PROSTATE SPECIFIC ANTIGEN): ICD-10-CM

## 2025-03-11 RX ORDER — PREGABALIN 75 MG/1
75 CAPSULE ORAL 2 TIMES DAILY
Qty: 60 CAPSULE | Refills: 1 | Status: SHIPPED | OUTPATIENT
Start: 2025-03-11

## 2025-03-11 NOTE — PROGRESS NOTES
Subjective   Rashid Maldonado is a 59 y.o. male who presents for annual male wellness exam.  Chief Complaint   Patient presents with    Annual Exam        History of Present Illness  The patient is a 59-year-old male who presents for an annual exam and follow-up on multiple chronic medical problems, including mood disorder, chronic pain, prediabetes, and dyslipidemia.    He has been experiencing severe pain, which was initially managed with gabapentin. 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 He reports significant leg pain and a lack of desire to engage in life. He has sought chiropractic care due to numbness in his feet and an inability to perceive temperature changes when walking on cold surfaces. Chiropractic adjustments have restored his ability to feel cold floors. He has not yet consulted a surgeon but is considering surgical intervention for his pain. He has new insurance now. He has been self-weaning off gabapentin, reducing his dosage from twice daily to once daily over the past month, and further decreasing the dose by three-quarters over the last 2.5 weeks. He reports that this has resulted in less severe pain and a feeling of tightness in his legs, as well as increased energy. Despite spending approximately 90 percent of his day seated, he feels more like himself since reducing his gabapentin intake. He is seeking a non-addictive medication for pain management. He has been taking ibuprofen 600 mg for inflammation but reports associated nausea. He typically takes 3 Aleve tablets before bed, which he finds effective in reducing pain the following day. He is requesting refills of his Eliquis prescription.    He has lost 10 pounds since his last visit. He has been attempting to lose weight through dietary changes, including a reduction in sugar intake. He has previously followed a diet of half a sandwich and a can of vegetables  "per day, which resulted in a 10-pound weight loss over several months. He has also tried a diet of 800 calories per day for a month, which resulted in a minimal weight loss of a few pounds. He experiences headaches and shaking when he reduces his food intake. He has considered weight loss injections but found them too expensive. He consumes 1 to 2 vitamin B energy drinks per day, each containing 10 calories, and drinks water throughout the day. He has increased his water intake from 1 large cup to 4 cups per day. He has been eating turkey wraps and low-cholesterol foods. He has noticed an increase in snacking since starting gabapentin, particularly at night, and has been consuming almonds, cashews, and Cheerios.    He has been prediabetic and is currently on medication for this condition. He has been making efforts to reduce his sugar intake.    He has been experiencing mood disturbances, which were initially improved with gabapentin. However, he reports that the medication is no longer effective in managing his mood. He describes a feeling of being \"bogged down\" and a lack of interest in life. He rarely uses Xanax for panic attacks and has a bottle and a half left from his last prescription.    MEDICATIONS  Current: gabapentin, Xanax, ibuprofen, Aleve, Eliquis    IMMUNIZATIONS  He does not receive influenza vaccinations.       Sexual History: yes, with spouse   Contraception: na  Diet: standard, trying to be healthy, decreased sugar  Exercise: gardening  Do you feel safe? yes  Have you ever been abused? no  Last dental exam: up to date  Eye exam: up to date    Colon Cancer Screenin  PSA: due      Immunization History   Administered Date(s) Administered    Pneumococcal Polysaccharide (PPSV23) 2020    Tdap 2020       The following portions of the patient's history were reviewed and updated as appropriate: allergies, current medications, past family history, past medical history, past social " history, past surgical history and problem list.    Past Medical History:   Diagnosis Date    Abdominal pain, LLQ     Abnormal findings on diagnostic imaging of abdomen     Acute deep vein thrombosis of lower extremity     Acute sinusitis     Anxiety and depression     Attention deficit disorder     Atypical depressive disorder     Bloating     BMI 40.0-44.9, adult     Chest pain     Chronic pain syndrome     Colitis     COPD (chronic obstructive pulmonary disease)     Cramps, muscle, general     Dehydration     Depression     Diarrhea     Difficulty breathing     Disc degeneration, lumbosacral     DVT (deep venous thrombosis)     left leg    DVT, lower extremity, recurrent     Edema     Fatigue     Fever     GERD (gastroesophageal reflux disease)     Heartburn     Hyperlipidemia     Idiopathic peripheral neuropathy     Idiopathic urticaria     Inflamed skin tag     Insomnia     Joint pain     Long-term use of high-risk medication     Low back pain     Lumbago     Lumbosacral neuritis     Lymphadenitis     Melanocytic nevus     Nausea     Neoplasm of uncertain behavior of skin     Neuralgia     Neuritis     Organic impotence     Osteoarthritis, multiple sites     Panic disorder without agoraphobia     Prothrombin mutation     Pulmonary embolism     bilateral lungs    Shingles     Shortness of breath        Past Surgical History:   Procedure Laterality Date    BACK SURGERY N/A 2015    done at [facility] in [place], [place]    COLONOSCOPY N/A 1/24/2017    Procedure: COLONOSCOPY to Cecum;  Surgeon: Denton Matthews MD;  Location: [facility] ENDOSCOPY;  Service:     ENDOSCOPY N/A 1/24/2017    Procedure: ESOPHAGOGASTRODUODENOSCOPY with Bx's;  Surgeon: Denton Matthews MD;  Location: [facility] ENDOSCOPY;  Service:     KNEE SURGERY Left 1985    Dr. Gloria    KNEE SURGERY Right 1986    OTHER SURGICAL HISTORY      post spinal diskect osteophytect lumb interspace microdiscec       Family History   Problem Relation Age of Onset     Hypertension Mother     Lung cancer Mother     Anxiety disorder Mother     Depression Mother     Heart disease Mother     Leukemia Paternal Uncle     Diabetes Paternal Grandmother     Prostate cancer Paternal Grandfather     Hypothyroidism Father     Prostate cancer Maternal Grandfather        Social History     Socioeconomic History    Marital status:      Spouse name: Yasmin   Tobacco Use    Smoking status: Former     Current packs/day: 0.00     Types: Cigarettes     Start date:      Quit date:      Years since quittin.2    Smokeless tobacco: Never   Vaping Use    Vaping status: Former   Substance and Sexual Activity    Alcohol use: Yes     Comment: Occasionally    Drug use: No    Sexual activity: Yes         Current Outpatient Medications:     ALPRAZolam (XANAX) 0.25 MG tablet, Take 1 tablet by mouth Every 12 (Twelve) Hours As Needed for Anxiety., Disp: 30 tablet, Rfl: 0    Cyanocobalamin (VITAMIN B 12 PO), Take  by mouth., Disp: , Rfl:     econazole nitrate (SPECTAZOLE) 1 % cream, Apply 1 application topically to the appropriate area as directed Daily., Disp: 30 g, Rfl: 2    Eliquis 5 MG tablet tablet, TAKE ONE TABLET BY MOUTH EVERY 12 HOURS, Disp: 60 tablet, Rfl: 5    FIBER PO, Take 1 capsule by mouth As Needed., Disp: , Rfl:     fluticasone (FLONASE) 50 MCG/ACT nasal spray, 1 spray into the nostril(s) as directed by provider Daily. (Patient taking differently: Administer 1 spray into the nostril(s) as directed by provider As Needed.), Disp: 18.2 mL, Rfl: 2    gabapentin (NEURONTIN) 300 MG capsule, Take 1 capsule by mouth 3 (Three) Times a Day., Disp: 90 capsule, Rfl: 0    montelukast (Singulair) 10 MG tablet, Take 1 tablet by mouth Every Night., Disp: 90 tablet, Rfl: 1    multivitamin with minerals tablet tablet, Take 1 tablet by mouth Daily., Disp: , Rfl:     Omega-3 Fatty Acids (OMEGA 3 PO), Take 1 capsule by mouth Daily., Disp: , Rfl:     vitamin C (ASCORBIC ACID) 250 MG tablet, Take 1  tablet by mouth Daily., Disp: , Rfl:     Zinc Acetate, Oral, (ZINC ACETATE PO), Take  by mouth., Disp: , Rfl:     pregabalin (Lyrica) 75 MG capsule, Take 1 capsule by mouth 2 (Two) Times a Day., Disp: 60 capsule, Rfl: 1    Current Facility-Administered Medications:     cyanocobalamin injection 1,000 mcg, 1,000 mcg, Intramuscular, Q28 Days, Kehrer, Meredith Lea, MD, 1,000 mcg at 03/06/25 1105    Review of Systems    Objective   Vitals:    03/11/25 1016   BP: 138/84   Pulse: 68   Temp: 98.2 °F (36.8 °C)   SpO2: 99%     Body mass index is 43.5 kg/m².  Physical Exam  Vitals and nursing note reviewed.   Constitutional:       General: He is not in acute distress.     Appearance: Normal appearance. He is well-developed.   HENT:      Head: Normocephalic and atraumatic.      Right Ear: Tympanic membrane, ear canal and external ear normal.      Left Ear: Tympanic membrane, ear canal and external ear normal.      Nose: Nose normal.      Mouth/Throat:      Mouth: Mucous membranes are moist.      Pharynx: Oropharynx is clear.   Eyes:      Conjunctiva/sclera: Conjunctivae normal.      Pupils: Pupils are equal, round, and reactive to light.   Neck:      Thyroid: No thyromegaly.   Cardiovascular:      Rate and Rhythm: Normal rate and regular rhythm.      Heart sounds: No murmur heard.  Pulmonary:      Effort: Pulmonary effort is normal.      Breath sounds: Normal breath sounds.   Abdominal:      General: Abdomen is flat. Bowel sounds are normal. There is no distension.      Palpations: Abdomen is soft. There is no mass.   Musculoskeletal:         General: No swelling or tenderness. Normal range of motion.      Cervical back: Neck supple.   Skin:     General: Skin is warm and dry.      Capillary Refill: Capillary refill takes less than 2 seconds.   Neurological:      Mental Status: He is alert and oriented to person, place, and time.   Psychiatric:         Mood and Affect: Mood normal.         Behavior: Behavior normal.        Physical Exam  Vital Signs  Blood pressure is 138/84.       Results     Drug Profile [id number] - Urine, Clean Catch (09/10/2024 10:16)    CONTROLLED SUBSTANCE AGREEMENT - SCAN - CSA FOR GABAPENTIN & ALPRAZOLAM (03/07/2024)     Assessment & Plan   Diagnoses and all orders for this visit:    1. Encounter for annual general medical examination with abnormal findings in adult (Primary)  -     TSH  -     Vitamin B12  -     Lipid Panel  -     CBC & Differential  -     Comprehensive Metabolic Panel  -     Hemoglobin A1c  -     PSA Screen    2. Chronic pain syndrome  -     pregabalin (Lyrica) 75 MG capsule; Take 1 capsule by mouth 2 (Two) Times a Day.  Dispense: 60 capsule; Refill: 1    3. Hypercoagulable state    4. Prediabetes  -     TSH  -     Lipid Panel  -     CBC & Differential  -     Comprehensive Metabolic Panel  -     Hemoglobin A1c    5. B12 deficiency  -     Vitamin B12    6. Atypical depressive disorder    7. Hyperlipidemia, unspecified hyperlipidemia type  -     TSH  -     Lipid Panel  -     CBC & Differential  -     Comprehensive Metabolic Panel    8. Degeneration of intervertebral disc of lumbar region with lower extremity pain    9. High risk medication use    10. Screening PSA (prostate specific antigen)  -     PSA Screen      Assessment & Plan  1. Chronic Pain.  He reports significant leg pain and has been self-weaning off gabapentin. He will be transitioned from gabapentin to Lyrica 75 mg twice daily, with a plan to titrate the dosage as needed. He is advised to take Tylenol 1000 mg twice daily for anti-inflammatory effects and to take NSAIDs with food to minimize gastrointestinal upset.    2. Mood Disorder.  He reports that gabapentin is no longer helping his mood and is causing him to feel bogged down. He will be switched to Lyrica, which may help with both pain and mood. He is advised to continue taking Xanax as needed for panic attacks.    3. Prediabetes.  Fasting labs will be conducted today to  monitor his prediabetes status. He is advised to maintain a daily caloric intake of no more than 1200 calories, focusing on small, frequent meals rich in protein, moderate in carbohydrates, and low in healthy fats. If his A1c levels indicate progression to diabetes, metformin may be considered.    4. Dyslipidemia.  He is advised to continue his current medication regimen and maintain a healthy diet.    5. Controlled Substance Management.  His controlled substance contract will be updated, and a drug screen will be conducted today. He is advised to call his pharmacy to ensure his Eliquis prescription is refilled, possibly due to an insurance change.    Follow-up  The patient will follow up in 1 month.            Discussed the importance of maintaining a healthy weight and getting regular exercise.  Educated patient on the benefits of healthy diet.  Advise follow-up annually for wellness exams.    There are no Patient Instructions on file for this visit.    Patient or patient representative verbalized consent for the use of Ambient Listening during the visit with  Meredith Lea Kehrer, MD for chart documentation. 3/11/2025  10:19 EDT

## 2025-03-12 ENCOUNTER — RESULTS FOLLOW-UP (OUTPATIENT)
Dept: FAMILY MEDICINE CLINIC | Facility: CLINIC | Age: 60
End: 2025-03-12
Payer: COMMERCIAL

## 2025-03-12 LAB
ALBUMIN SERPL-MCNC: 4.2 G/DL (ref 3.5–5.2)
ALBUMIN/GLOB SERPL: 1.5 G/DL
ALP SERPL-CCNC: 75 U/L (ref 39–117)
ALT SERPL-CCNC: 25 U/L (ref 1–41)
AST SERPL-CCNC: 27 U/L (ref 1–40)
BASOPHILS # BLD AUTO: 0.03 10*3/MM3 (ref 0–0.2)
BASOPHILS NFR BLD AUTO: 0.6 % (ref 0–1.5)
BILIRUB SERPL-MCNC: 0.4 MG/DL (ref 0–1.2)
BUN SERPL-MCNC: 14 MG/DL (ref 8–23)
BUN/CREAT SERPL: 15.6 (ref 7–25)
CALCIUM SERPL-MCNC: 9.6 MG/DL (ref 8.6–10.5)
CHLORIDE SERPL-SCNC: 101 MMOL/L (ref 98–107)
CHOLEST SERPL-MCNC: 212 MG/DL (ref 0–200)
CO2 SERPL-SCNC: 29 MMOL/L (ref 22–29)
CREAT SERPL-MCNC: 0.9 MG/DL (ref 0.76–1.27)
EGFRCR SERPLBLD CKD-EPI 2021: 97.8 ML/MIN/1.73
EOSINOPHIL # BLD AUTO: 0.17 10*3/MM3 (ref 0–0.4)
EOSINOPHIL NFR BLD AUTO: 3.1 % (ref 0.3–6.2)
ERYTHROCYTE [DISTWIDTH] IN BLOOD BY AUTOMATED COUNT: 12.1 % (ref 12.3–15.4)
GLOBULIN SER CALC-MCNC: 2.8 GM/DL
GLUCOSE SERPL-MCNC: 113 MG/DL (ref 65–99)
HBA1C MFR BLD: 6.3 % (ref 4.8–5.6)
HCT VFR BLD AUTO: 47.7 % (ref 37.5–51)
HDLC SERPL-MCNC: 41 MG/DL (ref 40–60)
HGB BLD-MCNC: 15.7 G/DL (ref 13–17.7)
IMM GRANULOCYTES # BLD AUTO: 0.01 10*3/MM3 (ref 0–0.05)
IMM GRANULOCYTES NFR BLD AUTO: 0.2 % (ref 0–0.5)
LDLC SERPL CALC-MCNC: 154 MG/DL (ref 0–100)
LYMPHOCYTES # BLD AUTO: 1.66 10*3/MM3 (ref 0.7–3.1)
LYMPHOCYTES NFR BLD AUTO: 30.5 % (ref 19.6–45.3)
MCH RBC QN AUTO: 30.8 PG (ref 26.6–33)
MCHC RBC AUTO-ENTMCNC: 32.9 G/DL (ref 31.5–35.7)
MCV RBC AUTO: 93.5 FL (ref 79–97)
MONOCYTES # BLD AUTO: 0.48 10*3/MM3 (ref 0.1–0.9)
MONOCYTES NFR BLD AUTO: 8.8 % (ref 5–12)
NEUTROPHILS # BLD AUTO: 3.1 10*3/MM3 (ref 1.7–7)
NEUTROPHILS NFR BLD AUTO: 56.8 % (ref 42.7–76)
NRBC BLD AUTO-RTO: 0 /100 WBC (ref 0–0.2)
PLATELET # BLD AUTO: 229 10*3/MM3 (ref 140–450)
POTASSIUM SERPL-SCNC: 4.6 MMOL/L (ref 3.5–5.2)
PROT SERPL-MCNC: 7 G/DL (ref 6–8.5)
PSA SERPL-MCNC: 1.55 NG/ML (ref 0–4)
RBC # BLD AUTO: 5.1 10*6/MM3 (ref 4.14–5.8)
SODIUM SERPL-SCNC: 141 MMOL/L (ref 136–145)
TRIGL SERPL-MCNC: 92 MG/DL (ref 0–150)
TSH SERPL DL<=0.005 MIU/L-ACNC: 2.24 UIU/ML (ref 0.27–4.2)
VIT B12 SERPL-MCNC: 799 PG/ML (ref 211–946)
VLDLC SERPL CALC-MCNC: 17 MG/DL (ref 5–40)
WBC # BLD AUTO: 5.45 10*3/MM3 (ref 3.4–10.8)

## 2025-03-15 LAB
ACCEPTABLE CREAT UR QL: 114.3 MG/DL (ref 20–300)
AMPHETAMINES UR QL SCN: NEGATIVE NG/ML
BARBITURATES UR QL: NEGATIVE NG/ML
BENZODIAZ UR QL SCN: NEGATIVE NG/ML
BZE UR QL: NEGATIVE NG/ML
CANNABINOIDS UR QL CFM: POSITIVE
ETHANOL UR-MCNC: NEGATIVE %
MEPERIDINE UR QL: NEGATIVE NG/ML
METHADONE UR QL: NEGATIVE NG/ML
OPIATES UR QL SCN: NEGATIVE NG/ML
OXYCODONE+OXYMORPHONE UR QL SCN: NEGATIVE NG/ML
PCP UR QL: NEGATIVE NG/ML
PROPOXYPH UR QL: NEGATIVE NG/ML
TRAMADOL UR QL SCN: NEGATIVE NG/ML

## 2025-04-06 DIAGNOSIS — D68.59 HYPERCOAGULABLE STATE: ICD-10-CM

## 2025-04-07 RX ORDER — APIXABAN 5 MG/1
5 TABLET, FILM COATED ORAL EVERY 12 HOURS SCHEDULED
Qty: 60 TABLET | Refills: 5 | Status: SHIPPED | OUTPATIENT
Start: 2025-04-07

## 2025-04-07 NOTE — TELEPHONE ENCOUNTER
Rx Refill Note  Requested Prescriptions     Pending Prescriptions Disp Refills    Eliquis 5 MG tablet tablet [Pharmacy Med Name: ELIQUIS 5 MG TABLET] 60 tablet 5     Sig: TAKE ONE TABLET BY MOUTH EVERY 12 HOURS      Last office visit with prescribing clinician: 3/11/2025   Last telemedicine visit with prescribing clinician: Visit date not found   Next office visit with prescribing clinician: 4/15/2025                         Would you like a call back once the refill request has been completed: [] Yes [] No    If the office needs to give you a call back, can they leave a voicemail: [] Yes [] No    Sandra Ricks MA  04/07/25, 08:29 EDT

## 2025-04-08 ENCOUNTER — CLINICAL SUPPORT (OUTPATIENT)
Dept: FAMILY MEDICINE CLINIC | Facility: CLINIC | Age: 60
End: 2025-04-08
Payer: COMMERCIAL

## 2025-04-08 DIAGNOSIS — E53.8 B12 DEFICIENCY: Primary | ICD-10-CM

## 2025-04-08 PROCEDURE — 96372 THER/PROPH/DIAG INJ SC/IM: CPT | Performed by: FAMILY MEDICINE

## 2025-04-08 RX ADMIN — CYANOCOBALAMIN 1000 MCG: 1000 INJECTION, SOLUTION INTRAMUSCULAR; SUBCUTANEOUS at 11:05

## 2025-04-15 ENCOUNTER — OFFICE VISIT (OUTPATIENT)
Dept: FAMILY MEDICINE CLINIC | Facility: CLINIC | Age: 60
End: 2025-04-15
Payer: COMMERCIAL

## 2025-04-15 VITALS
WEIGHT: 300.2 LBS | TEMPERATURE: 98.3 F | HEIGHT: 70 IN | SYSTOLIC BLOOD PRESSURE: 144 MMHG | OXYGEN SATURATION: 99 % | BODY MASS INDEX: 42.98 KG/M2 | HEART RATE: 61 BPM | DIASTOLIC BLOOD PRESSURE: 84 MMHG

## 2025-04-15 DIAGNOSIS — F32.89 ATYPICAL DEPRESSIVE DISORDER: Primary | ICD-10-CM

## 2025-04-15 DIAGNOSIS — Z79.899 HIGH RISK MEDICATION USE: ICD-10-CM

## 2025-04-15 DIAGNOSIS — G89.4 CHRONIC PAIN SYNDROME: ICD-10-CM

## 2025-04-15 DIAGNOSIS — N52.8 OTHER MALE ERECTILE DYSFUNCTION: ICD-10-CM

## 2025-04-15 PROCEDURE — 99214 OFFICE O/P EST MOD 30 MIN: CPT | Performed by: FAMILY MEDICINE

## 2025-04-15 RX ORDER — PREGABALIN 75 MG/1
75 CAPSULE ORAL 2 TIMES DAILY
Qty: 60 CAPSULE | Refills: 2 | Status: SHIPPED | OUTPATIENT
Start: 2025-04-15

## 2025-04-15 RX ORDER — TADALAFIL 20 MG/1
20 TABLET ORAL DAILY PRN
Qty: 30 TABLET | Refills: 0 | Status: SHIPPED | OUTPATIENT
Start: 2025-04-15

## 2025-04-15 NOTE — PROGRESS NOTES
Chief Complaint  Med Refill and Depression    Subjective        Rashid Maldonado presents to [facility]  History of Present Illness  History of Present Illness  The patient is a 59-year-old male who presents for a short-term follow-up on mood and pain management.    An improvement in his condition is reported since the last visit, attributed to a change in medication from gabapentin to Lyrica. Gabapentin has been discontinued, and he is currently on a regimen of Lyrica 75 mg twice daily, which is found beneficial. Initially, side effects such as dry mouth and mild headaches were experienced, but these have since subsided. The Lyrica has effectively reduced pain by approximately 50 percent. Aleve is also taken to manage inflammation, although the source of the inflammation is uncertain, possibly hip joints or nerve tingling. A decrease in numbness is noted. Satisfaction with the current dosage of Lyrica is expressed, and there is no desire to increase it. Gabapentin was effective in managing intrusive thoughts, and he believes his brain has since adapted, as these thoughts are no longer experienced. The effect of Lyrica is described as akin to being immobilized against a wall, unable to move in any direction.    Prediabetes is noted, and there is no interest in starting a statin at this time. Interest in losing weight is expressed, and efforts to cut down on sugar intake and eat healthily are ongoing.    A request for a testosterone level check is made due to increasing struggles. Issues with food are reported, with the ability to eat salmon all day long but consuming enough to fill up. If a salad is eaten, excessive dressing is added because green stuff cannot be eaten. He is not supposed to eat a lot of green stuff due to a virus. Supplements are obtained online and taken, with variable effectiveness. Cialis has not been tried.       Objective   Vital Signs:  /84   Pulse 61    "Temp 98.3 °F (36.8 °C)   Ht 177.8 cm (70\")   Wt (!) 136 kg (300 lb 3.2 oz)   SpO2 99%   BMI 43.07 kg/m²   Estimated body mass index is 43.07 kg/m² as calculated from the following:    Height as of this encounter: 177.8 cm (70\").    Weight as of this encounter: 136 kg (300 lb 3.2 oz).             Physical Exam  Constitutional:       General: He is not in acute distress.     Appearance: He is well-developed.   HENT:      Head: Normocephalic and atraumatic.   Eyes:      Conjunctiva/sclera: Conjunctivae normal.      Pupils: Pupils are equal, round, and reactive to light.   Pulmonary:      Effort: Pulmonary effort is normal. No respiratory distress.   Neurological:      Mental Status: He is alert and oriented to person, place, and time.   Psychiatric:         Mood and Affect: Mood normal.         Behavior: Behavior normal.        Physical Exam         Result Review :          Results  Labs   - Testosterone level: Normal              Assessment and Plan     Diagnoses and all orders for this visit:    1. Atypical depressive disorder (Primary)    2. Chronic pain syndrome  -     pregabalin (Lyrica) 75 MG capsule; Take 1 capsule by mouth 2 (Two) Times a Day.  Dispense: 60 capsule; Refill: 2    3. High risk medication use    4. Other male erectile dysfunction  -     tadalafil (Cialis) 20 MG tablet; Take 1 tablet by mouth Daily As Needed for Erectile Dysfunction.  Dispense: 30 tablet; Refill: 0      Assessment & Plan  1. Pain management.  - Significant improvement in pain with pregabalin 75 mg twice daily, reducing pain by approximately 50%.  - Initial side effects of dry mouth and headaches resolved after a few days.  - Continues to use Aleve for additional pain relief and inflammation.  - Prefers to stay at the current dose of pregabalin to avoid increased dry mouth.    2. Prediabetes.  - Prediabetic condition suggests the need for a statin, though wrist brace calculator does not recommend it.  - Advised to reduce sugar " intake to manage weight and improve overall health.  - Weight loss medication may be considered if diabetes progresses, but insurance coverage could be problematic.  - Encouraged to focus on dietary changes to prevent progression to diabetes.    3. Erectile dysfunction.  - Reports issues with erectile dysfunction and has not tried Cialis.  - Advised that hormone levels will not resolve the issue, indicating poor circulation.  - Recommended to try generic Cialis for symptom management.  - Encouraged to work on diet and weight loss to improve overall health and circulation.    4. Low testosterone.  - Requests a testosterone level check due to increased struggles.  - Previous testosterone levels were within normal limits for his age.  - Advised that weight loss and dietary changes are the best methods to correct testosterone levels rather than supplementation.  - Supplementation would require concurrent statin therapy due to increased cardiovascular risk.    Follow-up  - Recheck in 3 months on pregabalin effectiveness and overall health status.            Follow Up     Return in about 3 months (around 7/15/2025) for Recheck.  Patient was given instructions and counseling regarding his condition or for health maintenance advice. Please see specific information pulled into the AVS if appropriate.    Patient or patient representative verbalized consent for the use of Ambient Listening during the visit with  Meredith Lea Kehrer, MD for chart documentation. 4/15/2025  13:50 EDT

## 2025-05-08 ENCOUNTER — CLINICAL SUPPORT (OUTPATIENT)
Dept: FAMILY MEDICINE CLINIC | Facility: CLINIC | Age: 60
End: 2025-05-08
Payer: COMMERCIAL

## 2025-05-08 DIAGNOSIS — E53.8 B12 DEFICIENCY: Primary | ICD-10-CM

## 2025-05-08 PROCEDURE — 96372 THER/PROPH/DIAG INJ SC/IM: CPT | Performed by: FAMILY MEDICINE

## 2025-05-08 RX ADMIN — CYANOCOBALAMIN 1000 MCG: 1000 INJECTION, SOLUTION INTRAMUSCULAR; SUBCUTANEOUS at 11:33

## 2025-06-11 ENCOUNTER — CLINICAL SUPPORT (OUTPATIENT)
Dept: FAMILY MEDICINE CLINIC | Facility: CLINIC | Age: 60
End: 2025-06-11
Payer: COMMERCIAL

## 2025-06-11 DIAGNOSIS — E53.8 B12 DEFICIENCY: Primary | ICD-10-CM

## 2025-06-11 RX ADMIN — CYANOCOBALAMIN 1000 MCG: 1000 INJECTION, SOLUTION INTRAMUSCULAR; SUBCUTANEOUS at 13:23

## (undated) DEVICE — BITEBLOCK OMNI BLOC

## (undated) DEVICE — Device: Brand: DEFENDO AIR/WATER/SUCTION AND BIOPSY VALVE

## (undated) DEVICE — CANN NASL CO2 TRULINK W/O2 A/

## (undated) DEVICE — TUBING, SUCTION, 1/4" X 10', STRAIGHT: Brand: MEDLINE

## (undated) DEVICE — THE TORRENT IRRIGATION SCOPE CONNECTOR IS USED WITH THE TORRENT IRRIGATION TUBING TO PROVIDE IRRIGATION FLUIDS SUCH AS STERILE WATER DURING GASTROINTESTINAL ENDOSCOPIC PROCEDURES WHEN USED IN CONJUNCTION WITH AN IRRIGATION PUMP (OR ELECTROSURGICAL UNIT).: Brand: TORRENT

## (undated) DEVICE — SINGLE-USE BIOPSY FORCEPS: Brand: RADIAL JAW 4